# Patient Record
Sex: MALE | Race: WHITE | NOT HISPANIC OR LATINO
[De-identification: names, ages, dates, MRNs, and addresses within clinical notes are randomized per-mention and may not be internally consistent; named-entity substitution may affect disease eponyms.]

---

## 2017-04-05 ENCOUNTER — APPOINTMENT (OUTPATIENT)
Dept: UROLOGY | Facility: CLINIC | Age: 72
End: 2017-04-05

## 2019-05-10 ENCOUNTER — INPATIENT (INPATIENT)
Facility: HOSPITAL | Age: 74
LOS: 5 days | Discharge: HOME | DRG: 39 | End: 2019-05-16
Attending: SURGERY | Admitting: SPECIALIST
Payer: COMMERCIAL

## 2019-05-10 VITALS
RESPIRATION RATE: 16 BRPM | OXYGEN SATURATION: 95 % | TEMPERATURE: 98 F | DIASTOLIC BLOOD PRESSURE: 85 MMHG | SYSTOLIC BLOOD PRESSURE: 204 MMHG | HEART RATE: 66 BPM

## 2019-05-10 DIAGNOSIS — Z95.9 PRESENCE OF CARDIAC AND VASCULAR IMPLANT AND GRAFT, UNSPECIFIED: Chronic | ICD-10-CM

## 2019-05-10 DIAGNOSIS — L05.91 PILONIDAL CYST WITHOUT ABSCESS: Chronic | ICD-10-CM

## 2019-05-10 LAB
ALBUMIN SERPL ELPH-MCNC: 4.4 G/DL — SIGNIFICANT CHANGE UP (ref 3.3–5)
ALP SERPL-CCNC: 52 U/L — SIGNIFICANT CHANGE UP (ref 40–120)
ALT FLD-CCNC: 24 U/L — SIGNIFICANT CHANGE UP (ref 10–45)
ANION GAP SERPL CALC-SCNC: 10 MMOL/L — SIGNIFICANT CHANGE UP (ref 5–17)
APTT BLD: 31.7 SEC — SIGNIFICANT CHANGE UP (ref 27.5–36.3)
AST SERPL-CCNC: 21 U/L — SIGNIFICANT CHANGE UP (ref 10–40)
BASOPHILS # BLD AUTO: 0.1 K/UL — SIGNIFICANT CHANGE UP (ref 0–0.2)
BASOPHILS NFR BLD AUTO: 0.9 % — SIGNIFICANT CHANGE UP (ref 0–2)
BILIRUB SERPL-MCNC: 0.6 MG/DL — SIGNIFICANT CHANGE UP (ref 0.2–1.2)
BUN SERPL-MCNC: 18 MG/DL — SIGNIFICANT CHANGE UP (ref 7–23)
CALCIUM SERPL-MCNC: 9.5 MG/DL — SIGNIFICANT CHANGE UP (ref 8.4–10.5)
CHLORIDE SERPL-SCNC: 103 MMOL/L — SIGNIFICANT CHANGE UP (ref 96–108)
CO2 SERPL-SCNC: 27 MMOL/L — SIGNIFICANT CHANGE UP (ref 22–31)
CREAT SERPL-MCNC: 0.88 MG/DL — SIGNIFICANT CHANGE UP (ref 0.5–1.3)
EOSINOPHIL # BLD AUTO: 0.3 K/UL — SIGNIFICANT CHANGE UP (ref 0–0.5)
EOSINOPHIL NFR BLD AUTO: 2.7 % — SIGNIFICANT CHANGE UP (ref 0–6)
GLUCOSE SERPL-MCNC: 92 MG/DL — SIGNIFICANT CHANGE UP (ref 70–99)
HCT VFR BLD CALC: 46.7 % — SIGNIFICANT CHANGE UP (ref 39–50)
HGB BLD-MCNC: 15.7 G/DL — SIGNIFICANT CHANGE UP (ref 13–17)
INR BLD: 1.1 RATIO — SIGNIFICANT CHANGE UP (ref 0.88–1.16)
LYMPHOCYTES # BLD AUTO: 1.7 K/UL — SIGNIFICANT CHANGE UP (ref 1–3.3)
LYMPHOCYTES # BLD AUTO: 15.7 % — SIGNIFICANT CHANGE UP (ref 13–44)
MCHC RBC-ENTMCNC: 29.7 PG — SIGNIFICANT CHANGE UP (ref 27–34)
MCHC RBC-ENTMCNC: 33.5 GM/DL — SIGNIFICANT CHANGE UP (ref 32–36)
MCV RBC AUTO: 88.7 FL — SIGNIFICANT CHANGE UP (ref 80–100)
MONOCYTES # BLD AUTO: 0.9 K/UL — SIGNIFICANT CHANGE UP (ref 0–0.9)
MONOCYTES NFR BLD AUTO: 8.5 % — SIGNIFICANT CHANGE UP (ref 2–14)
NEUTROPHILS # BLD AUTO: 8 K/UL — HIGH (ref 1.8–7.4)
NEUTROPHILS NFR BLD AUTO: 72.3 % — SIGNIFICANT CHANGE UP (ref 43–77)
PLATELET # BLD AUTO: 233 K/UL — SIGNIFICANT CHANGE UP (ref 150–400)
POTASSIUM SERPL-MCNC: 3.9 MMOL/L — SIGNIFICANT CHANGE UP (ref 3.5–5.3)
POTASSIUM SERPL-SCNC: 3.9 MMOL/L — SIGNIFICANT CHANGE UP (ref 3.5–5.3)
PROT SERPL-MCNC: 7.2 G/DL — SIGNIFICANT CHANGE UP (ref 6–8.3)
PROTHROM AB SERPL-ACNC: 12.7 SEC — SIGNIFICANT CHANGE UP (ref 10–12.9)
RBC # BLD: 5.27 M/UL — SIGNIFICANT CHANGE UP (ref 4.2–5.8)
RBC # FLD: 13.1 % — SIGNIFICANT CHANGE UP (ref 10.3–14.5)
SODIUM SERPL-SCNC: 140 MMOL/L — SIGNIFICANT CHANGE UP (ref 135–145)
WBC # BLD: 11.1 K/UL — HIGH (ref 3.8–10.5)
WBC # FLD AUTO: 11.1 K/UL — HIGH (ref 3.8–10.5)

## 2019-05-10 PROCEDURE — 70498 CT ANGIOGRAPHY NECK: CPT | Mod: 26

## 2019-05-10 PROCEDURE — 99220: CPT

## 2019-05-10 PROCEDURE — 93010 ELECTROCARDIOGRAM REPORT: CPT

## 2019-05-10 PROCEDURE — 71046 X-RAY EXAM CHEST 2 VIEWS: CPT | Mod: 26

## 2019-05-10 PROCEDURE — 70496 CT ANGIOGRAPHY HEAD: CPT | Mod: 26

## 2019-05-10 RX ORDER — CLOPIDOGREL BISULFATE 75 MG/1
75 TABLET, FILM COATED ORAL DAILY
Refills: 0 | Status: DISCONTINUED | OUTPATIENT
Start: 2019-05-10 | End: 2019-05-11

## 2019-05-10 RX ORDER — ATORVASTATIN CALCIUM 80 MG/1
80 TABLET, FILM COATED ORAL AT BEDTIME
Refills: 0 | Status: DISCONTINUED | OUTPATIENT
Start: 2019-05-10 | End: 2019-05-15

## 2019-05-10 RX ADMIN — ATORVASTATIN CALCIUM 80 MILLIGRAM(S): 80 TABLET, FILM COATED ORAL at 23:35

## 2019-05-10 RX ADMIN — CLOPIDOGREL BISULFATE 75 MILLIGRAM(S): 75 TABLET, FILM COATED ORAL at 23:35

## 2019-05-10 NOTE — ED CDU PROVIDER INITIAL DAY NOTE - DETAILS
- frequent re-eval  - vitals q 4hrs  - tele  - neurochecks q 4hrs  - b/l carotid duplex  - neuro and vascular following  - case discussed with attending Dr. Lopez

## 2019-05-10 NOTE — ED PROVIDER NOTE - CRITICAL CARE PROVIDED
direct patient care (not related to procedure)/additional history taking/interpretation of diagnostic studies/neuro/documentation/consultation with other physicians

## 2019-05-10 NOTE — ED ADULT TRIAGE NOTE - CHIEF COMPLAINT QUOTE
slurred speech since 1030 and numbness to right side of face and couldn't find his words this morning

## 2019-05-10 NOTE — CONSULT NOTE ADULT - ATTENDING COMMENTS
Carotid US confirms high grade  70-99 L ICA stenosis which is symptomatic.  Will admit patient to stroke ICR.    Plan   Start Heparin gtt PTT goal 60-80 No bolus.  MRI Brain.  Close neuro checks.

## 2019-05-10 NOTE — CONSULT NOTE ADULT - SUBJECTIVE AND OBJECTIVE BOX
VASCULAR SURGERY SERVICE (pager - #[phone]) - CONSULT NOTE  --------------------------------------------------------------------------------------------    Patient is a 73y old  Male who presents with a chief complaint of R face numbness     HPI: 72 yo M with a PMH of HTN, HLD, CAD (s/p stent 2/2019) and GERD p/w word finding difficulty, lasting 1.5 minutes this AM w/o recurrence, starting around 1000. Pt also notes mildly decreased sensation on the right jaw line and over the right side of his lip, occurred a week ago then recurred today at the same time as the word finding difficulty. No focal weakness or other numbness, no headache/vision changes. He has had similar decreased facial sensation intermittently for months, maybe occurring once or twice per month. No prior evals for this change in sensation. CTA of neck performed revealing 70% occlusion of L ICA. Vascular surgery consulted for possible intervention given persistent symptoms.     ROS: 10-system review is otherwise negative except HPI above.      PAST MEDICAL & SURGICAL HISTORY:  Dyslipidemia  Hiatal hernia with GERD  Hypertension  Carpal tunnel syndrome, right  Coronary artery disease: stent placed 2014  Pilonidal cyst  S/P angioplasty with stent: 2014    FAMILY HISTORY:  Family history of coronary artery disease (Father)  Family history of cerebrovascular accident (CVA)  Family history of CABG (Mother)    SOCIAL HISTORY:  Never smoker. Rarely drinks EtOH. Denied drug use.     ALLERGIES: amoxicillin (Hives)  penicillin (Hives)      HOME MEDICATIONS:   Aspirin Low Dose 81 mg oral delayed release tablet: 1 tab(s) orally once a day (12 Jun 2016 02:55)  Sue 5 mg-20 mg oral tablet: 1 tab(s) orally once a day (12 Jun 2016 02:55)  Co Q-10 100 mg oral capsule: 1 cap(s) orally once a day (12 Jun 2016 02:55)  Shannan-C 500 mg oral tablet:  orally  (12 Jun 2016 02:55)  Flax Seed Oil oral capsule:  orally  (12 Jun 2016 02:55)  metoprolol tartrate 25 mg oral tablet: 1 tab(s) orally once a day (12 Jun 2016 02:55)  multivitamin:    (12 Jun 2016 02:55)  potassium citrate: 90 milligram(s) orally once a day (12 Jun 2016 02:55)  simvastatin 20 mg oral tablet: 1 tab(s) orally once a day (at bedtime) (12 Jun 2016 02:55)      CURRENT MEDICATIONS  MEDICATIONS (STANDING):   MEDICATIONS (PRN):  --------------------------------------------------------------------------------------------    Vitals:   T(C): 36.8 (05-10-19 @ 19:22), Max: 37.2 (05-10-19 @ 16:40)  HR: 71 (05-10-19 @ 19:22) (59 - 71)  BP: 141/70 (05-10-19 @ 19:22) (135/62 - 204/85)  RR: 17 (05-10-19 @ 19:22) (16 - 18)  SpO2: 96% (05-10-19 @ 19:22) (95% - 97%)  CAPILLARY BLOOD GLUCOSE  85 (10 May 2019 16:39)      POCT Blood Glucose.: 85 mg/dL (10 May 2019 16:25)    CAPILLARY BLOOD GLUCOSE  85 (10 May 2019 16:39)      POCT Blood Glucose.: 85 mg/dL (10 May 2019 16:25)          PHYSICAL EXAM:   General: NAD  HEENT: Normocephalic, atraumatic, EOMI, PEERLA.  Cardiac: RRR  Respiratory: Breathing comfortably on RA   Abdomen: Soft, non-distended, non-tender   Groin: Normal appearing  Ext: moving all ext warm  Neuro: CN II-XII intact. Strength and sensation intact in bilateral upper and lower ext.   --------------------------------------------------------------------------------------------    LABS  CBC (05-10 @ 16:36)                              15.7                           11.1<H>  )----------------(  233        72.3  % Neutrophils, 15.7  % Lymphocytes, ANC: 8.0<H>                              46.7      BMP (05-10 @ 16:36)             140     |  103     |  18    		Ca++ --      Ca 9.5                ---------------------------------( 92    		Mg --                 3.9     |  27      |  0.88  			Ph --        LFTs (05-10 @ 16:36)      TPro 7.2 / Alb 4.4 / TBili 0.6 / DBili -- / AST 21 / ALT 24 / AlkPhos 52    Coags (05-10 @ 16:36)  aPTT 31.7 / INR 1.10 / PT 12.7          --------------------------------------------------------------------------------------------    MICROBIOLOGY      --------------------------------------------------------------------------------------------    IMAGING  < from: CT Angio Neck w/ IV Cont (05.10.19 @ 16:49) >  FINDINGS:    The aortic arch and origins of the great vessels are within normal limits.    Right: The origin of the right common carotid artery is normal. The right   common carotid artery is normal in course and caliber to the carotid   bifurcation. Origins of the internal and external carotid arteries are   normal. The right internal carotid artery is normal thereafter in course   and caliber to the intracranial segment.    The intracranial internal carotid artery is normal to the [place]. There is a hypoplastic right A1 segment of the anterior cerebral,   normal anatomic variant  There is a hypoplastic distal right vertebral segment..    Branch vasculature of the posterior circulation is within normal limits.   Fetal origin of the left PCA from the left internal carotid artery    There is no evidence for aneurysm, stenosis, or vascular malformation.      Left: The origin of the left common carotid artery is normal. The left   common carotid artery is normal in course and caliber to the carotid   bifurcation. 70% stenosis at the origin of the left internal carotid   artery is suggested based on NASCETcriteria. The intracranial internal   carotid artery is normal to the [place]. The anterior middle   cerebral arteries demonstrate normal enhancement and arborization   symmetric fat on the right.    The origin of the left vertebral artery is normal. The left vertebral   artery is normal in course in caliber to intradural segment and   vertebrobasilar confluence.    There is no evidence for aneurysm, stenosis, or vascular malformation.        The visualized major dural venous sinuses and deep cerebral venous   structures demonstrate normal enhancement.    There is no definite evidence for enhancement abnormality within the   brain parenchyma.        IMPRESSION:  Evaluation of the brain demonstrates a high attenuation focus in the left   centrum semiovale ovale suspicious for mineralization in association with   the possible cavernoma or an old area of hemorrhage or infection.  Evidence of a 70% stenosis of the left internal carotid artery with a   calcified plaque at its origin at the level of the carotid bulb. The   right internal carotid artery origin is unremarkable    < end of copied text >

## 2019-05-10 NOTE — ED ADULT NURSE NOTE - CHPI ED NUR SYMPTOMS NEG
no change in level of consciousness/no weakness/no blurred vision/no dizziness/no vomiting/no fever/no confusion/no loss of consciousness/no nausea

## 2019-05-10 NOTE — ED ADULT NURSE REASSESSMENT NOTE - COMFORT CARE
ambulated to bathroom/warm blanket provided/meal provided/po fluids offered/repositioned/darkened lights/plan of care explained

## 2019-05-10 NOTE — ED CLERICAL - NS ED CLERK NOTE PRE-ARRIVAL INFORMATION; ADDITIONAL PRE-ARRIVAL INFORMATION
CC/Reason For referral: Stroke  Preferred Consultant(if applicable):  Who admits for you (if needed):  Do you have documents you would like to fax over?  Would you still like to speak to an ED attending? no

## 2019-05-10 NOTE — ED PROVIDER NOTE - PMH
Carpal tunnel syndrome, right    Coronary artery disease  stent placed 2014  Dyslipidemia    Hiatal hernia with GERD    Hypertension

## 2019-05-10 NOTE — ED ADULT NURSE NOTE - NSIMPLEMENTINTERV_GEN_ALL_ED
Implemented All Universal Safety Interventions:  [place] to call system. Call bell, personal items and telephone within reach. Instruct patient to call for assistance. Room bathroom lighting operational. Non-slip footwear when patient is off stretcher. Physically safe environment: no spills, clutter or unnecessary equipment. Stretcher in lowest position, wheels locked, appropriate side rails in place.

## 2019-05-10 NOTE — CONSULT NOTE ADULT - ASSESSMENT
ASSESSMENT: Patient is a 73M w/ PMHx as noted with months of intermittent right sided facial numbness and today brief moment of "word finding difficulty"  now resolved with CTA of neck revealing 70% occlusion of L ICA    PLAN:    - No acute surgical intervention at this time  - Please obtain bilateral carotid duplex to assess degree of stenosis  - Recommend medical management of TIA symptoms for now  - will follow  - Plan discussed with vascular fellow, Dr Fabian, on behalf of  Attending, Dr. Brandyn Decker PGY-2  Vascular surgery team  [id number] ASSESSMENT: Patient is a 73M w/ PMHx as noted with months of intermittent right sided facial numbness and today brief moment of "word finding difficulty"  now resolved with CTA of neck revealing 70% occlusion of L ICA    PLAN:    - No acute surgical intervention at this time  - Please obtain bilateral carotid duplex to assess degree of stenosis, maybe obtained as outpatient  - Pt should be instructed to follow-up with Dr Ahumada, call to schedule appointment [phone]  - Dispo as per ED  - Plan to be discussed with vascular fellow, Dr Fabian, on behalf of  Attending, Dr. Brandyn Decker PGY-2  Vascular surgery team  [id number] ASSESSMENT: Patient is a 73M w/ PMHx as noted with months of intermittent right sided facial numbness and today brief moment of "word finding difficulty"  now resolved with CTA of neck revealing 70% occlusion of L ICA    PLAN:    - No acute surgical intervention at this time  - Please obtain bilateral carotid duplex to assess degree of stenosis  - Pt should be instructed to follow-up with Dr Ahumada, call to schedule appointment [phone]  - Dispo as per neurology  - Plan to be discussed with vascular fellow, Dr Fabian, on behalf of  Attending, Dr. Brandyn Decker PGY-2  Vascular surgery team  [id number] ASSESSMENT: Patient is a 73M w/ PMHx as noted with months of intermittent right sided facial numbness and today brief moment of "word finding difficulty"  now resolved with CTA of neck revealing 70% occlusion of L ICA    PLAN:    - No acute surgical intervention at this time  - Please obtain bilateral carotid duplex to assess degree of stenosis  - Dispo as per neurology  - Plan to be discussed with vascular fellow, Dr Fabian, on behalf of  Attending, Dr. Brandyn Decker PGY-2  Vascular surgery team  [id number]

## 2019-05-10 NOTE — ED ADULT NURSE REASSESSMENT NOTE - NS ED NURSE REASSESS COMMENT FT1
Report received from   JORGE Mendosa  Pt resting comfortably with family at bedside.   Awaiting neuro and vascular consult  Safety maintained at all times, bed in lowest position, call bell in hand.  Will continue to monitor closely  Patient is A&Ox4. Face is symmetrical. PERRL 3mmB. Speech is clear. Patient is moving all extremities with 5/5 strength  please see paper neuro assessment in chart
pending CDU eval
pts family requesting answers to test results and CT scans  MD Lopez aware, speaking with patient and family  TBA
Received pt from JORGE Morales, received pt alert and responsive, oriented x4, denies any respiratory distress, SOB, or difficulty breathing. Pt transferred to CDU for neuro symptoms. Pt is asymptomatic currently and neuro intact. Pending carotid doppler in AM, pt aware. IV in place, patent and free of signs of infiltration, placed on continuous cardiac monitoring as ordered, NSR HR 60's.  pt denies chest pain or palpitations, V/S stable, pt afebrile, pt denies pain at this time. Pt educated on unit and unit rules, instructed patient to notify RN of any needed assistance, Pt verbalizes understanding, Call bell placed within reach. Safety maintained. Will continue to monitor. Family at bedside.

## 2019-05-10 NOTE — ED PROVIDER NOTE - OBJECTIVE STATEMENT
74 yo M with a PMH of HTN, HLD, CAD (s/p stent 2/2019) and GERD p/w word finding difficulty, lasting 1.5 minutes this AM w/o recurrence, starting around 1000. Pt also notes mildly decreased sensation on the right jaw line and over the right side of his lip, occurred a week ago then recurred today at the same time as the word finding difficulty. No focal weakness or other numbness, no headache/vision changes. He has had similar decreased facial sensation intermittently for months, maybe occurring once or twice per month. No prior evals for this change in sensation.

## 2019-05-10 NOTE — CONSULT NOTE ADULT - SUBJECTIVE AND OBJECTIVE BOX
HPI:  Pt is a 74 yo M with a PMH of HTN, HLD, CAD (s/p stent 2/2019) and GERD who is presenting with complaint of episode of "word finding difficulty" lasting 1.5 minutes this AM w/o recurrence LKN 1000. Pt notes 10% decreased sensation on the right jaw line and lip corner which has been waxing and waning over the past several days. Otherwise pt denies prior word finding difficulties, recent fevers, chills, HA, dizziness, vision changes. NIHSS: 0. MRS: 0.      MEDICATIONS  (STANDING):    MEDICATIONS  (PRN):    PAST MEDICAL & SURGICAL HISTORY:  Dyslipidemia  Hiatal hernia with GERD  Hypertension  Carpal tunnel syndrome, right  Coronary artery disease: stent placed 2014  Pilonidal cyst  S/P angioplasty with stent: 2014    FAMILY HISTORY:  Family history of coronary artery disease (Father)  Family history of cerebrovascular accident (CVA)  Family history of CABG (Mother)    Allergies    amoxicillin (Hives)  penicillin (Hives)    Intolerances    SHx - No smoking, No ETOH, No drug abuse    Review of Systems:  See HPI.    Vital Signs Last 24 Hrs  T(C): 36.7 (10 May 2019 16:20), Max: 36.7 (10 May 2019 16:20)  T(F): 98.1 (10 May 2019 16:20), Max: 98.1 (10 May 2019 16:20)  HR: 66 (10 May 2019 16:20) (66 - 66)  BP: 204/85 (10 May 2019 16:20) (204/85 - 204/85)  BP(mean): --  RR: 16 (10 May 2019 16:20) (16 - 16)  SpO2: 95% (10 May 2019 16:20) (95% - 95%)      General Exam:   General appearance: No acute distress             Neurological Exam:  Mental Status: Orientated to self, date and place.  Attention intact.  No dysarthria. Speech fluent.  Cranial Nerves:   PERRL, EOMI, VFF, no nystagmus. 10% decreased sensation to light touch in part of right CN V3.  No facial asymmetry.  Hearing intact to finger rub bilaterally.  Tongue, uvula and palate midline.  Sternocleidomastoid and Trapezius intact bilaterally.    Motor:   Tone: normal.                  Strength:     [] Upper extremity                      Delt       Bicep    Tricep                                                  R         5/5        5/5        5/5       5/5                                               L          5/5        5/5        5/5       5/5  [] Lower extremity                       HF          KE          KF        DF         PF                                               R        5/5 5/5 5/5 5/5 5/5                                               L         5/5 5/5 5/5 5/5 5/5  Pronator drift: none                 Dysmetria: None to finger-nose-finger b/l  No truncal ataxia.    Tremor: No resting, postural or action tremor.  No myoclonus.    Sensation: intact to light touch throughout, no extinction    Toes downgoing b/l    Gait: normal. HPI:  Pt is a 72 yo M with a PMH of HTN, HLD, CAD (s/p stent 2/2019), b/l Carotid Stenosis and GERD who is presenting with complaint of episode of "word finding difficulty" lasting 1.5 minutes this AM w/o recurrence. LKN 1000. Pt notes 10% decreased sensation on the right jaw line and lip corner which has been waxing and waning over the past several days. Otherwise pt denies prior word finding difficulties, recent fevers, chills, HA, dizziness, vision changes. NIHSS: 0. MRS: 0.      MEDICATIONS  (STANDING):    MEDICATIONS  (PRN):    PAST MEDICAL & SURGICAL HISTORY:  Dyslipidemia  Hiatal hernia with GERD  Hypertension  Carpal tunnel syndrome, right  Coronary artery disease: stent placed 2014  Pilonidal cyst  S/P angioplasty with stent: 2014    FAMILY HISTORY:  Family history of coronary artery disease (Father)  Family history of cerebrovascular accident (CVA)  Family history of CABG (Mother)    Allergies    amoxicillin (Hives)  penicillin (Hives)    Intolerances    SHx - No smoking, No ETOH, No drug abuse    Review of Systems:  See HPI.    Vital Signs Last 24 Hrs  T(C): 36.7 (10 May 2019 16:20), Max: 36.7 (10 May 2019 16:20)  T(F): 98.1 (10 May 2019 16:20), Max: 98.1 (10 May 2019 16:20)  HR: 66 (10 May 2019 16:20) (66 - 66)  BP: 204/85 (10 May 2019 16:20) (204/85 - 204/85)  BP(mean): --  RR: 16 (10 May 2019 16:20) (16 - 16)  SpO2: 95% (10 May 2019 16:20) (95% - 95%)      General Exam:   General appearance: No acute distress             Neurological Exam:  Mental Status: Orientated to self, date and place.  Attention intact.  No dysarthria. Speech fluent.  Cranial Nerves:   PERRL, EOMI, VFF, no nystagmus. 10% decreased sensation to light touch in part of right CN V3.  No facial asymmetry.  Hearing intact to finger rub bilaterally.  Tongue, uvula and palate midline.  Sternocleidomastoid and Trapezius intact bilaterally.    Motor:   Tone: normal.                  Strength:     [] Upper extremity                      Delt       Bicep    Tricep                                                  R         5/5        5/5        5/5       5/5                                               L          5/5 5/5 5/5 5/5  [] Lower extremity                       HF          KE          KF        DF         PF                                               R        5/5 5/5 5/5 5/5 5/5                                               L         5/5 5/5 5/5 5/5 5/5  Pronator drift: none                 Dysmetria: None to finger-nose-finger b/l  No truncal ataxia.    Tremor: No resting, postural or action tremor.  No myoclonus.    Sensation: intact to light touch throughout, no extinction    Toes downgoing b/l    Gait: normal. HPI:  Pt is a 74 yo M with a PMH of HTN, HLD, CAD (s/p stent 2/2014), b/l Carotid Stenosis and GERD who is presenting with complaint of episode of "word finding difficulty" lasting 1.5 minutes this AM w/o recurrence. LKN 1000. Pt notes 10% decreased sensation on the right jaw line and lip corner which has been waxing and waning over the past several days. Otherwise pt denies prior word finding difficulties, recent fevers, chills, HA, dizziness, vision changes. NIHSS: 0. MRS: 0.      MEDICATIONS  (STANDING):    MEDICATIONS  (PRN):    PAST MEDICAL & SURGICAL HISTORY:  Dyslipidemia  Hiatal hernia with GERD  Hypertension  Carpal tunnel syndrome, right  Coronary artery disease: stent placed 2014  Pilonidal cyst  S/P angioplasty with stent: 2014    FAMILY HISTORY:  Family history of coronary artery disease (Father)  Family history of cerebrovascular accident (CVA)  Family history of CABG (Mother)    Allergies    amoxicillin (Hives)  penicillin (Hives)    Intolerances    SHx - No smoking, No ETOH, No drug abuse    Review of Systems:  See HPI.    Vital Signs Last 24 Hrs  T(C): 36.7 (10 May 2019 16:20), Max: 36.7 (10 May 2019 16:20)  T(F): 98.1 (10 May 2019 16:20), Max: 98.1 (10 May 2019 16:20)  HR: 66 (10 May 2019 16:20) (66 - 66)  BP: 204/85 (10 May 2019 16:20) (204/85 - 204/85)  BP(mean): --  RR: 16 (10 May 2019 16:20) (16 - 16)  SpO2: 95% (10 May 2019 16:20) (95% - 95%)      General Exam:   General appearance: No acute distress             Neurological Exam:  Mental Status: Orientated to self, date and place.  Attention intact.  No dysarthria. Speech fluent.  Cranial Nerves:   PERRL, EOMI, VFF, no nystagmus. 10% decreased sensation to light touch in part of right CN V3.  No facial asymmetry.  Hearing intact to finger rub bilaterally.  Tongue, uvula and palate midline.  Sternocleidomastoid and Trapezius intact bilaterally.    Motor:   Tone: normal.                  Strength:     [] Upper extremity                      Delt       Bicep    Tricep                                                  R         5/5        5/5        5/5       5/5                                               L          5/5 5/5 5/5 5/5  [] Lower extremity                       HF          KE          KF        DF         PF                                               R        5/5 5/5 5/5 5/5 5/5                                               L         5/5 5/5 5/5 5/5 5/5  Pronator drift: none                 Dysmetria: None to finger-nose-finger b/l  No truncal ataxia.    Tremor: No resting, postural or action tremor.  No myoclonus.    Sensation: intact to light touch throughout, no extinction    Toes downgoing b/l    Gait: normal. HPI:  Pt is a 74 yo M with a PMH of HTN, HLD, CAD (s/p stent 2/2014), b/l Carotid Stenosis and GERD who is presenting with complaint of episode of "word finding difficulty" lasting 1.5 minutes this AM w/o recurrence. LKN 1000. Pt notes 10% decreased sensation on the right jaw line and lip corner which has been waxing and waning over the past several weeks. Otherwise pt denies prior word finding difficulties, recent fevers, chills, HA, dizziness, vision changes. NIHSS: 0. MRS: 0.      MEDICATIONS  (STANDING):    MEDICATIONS  (PRN):    PAST MEDICAL & SURGICAL HISTORY:  Dyslipidemia  Hiatal hernia with GERD  Hypertension  Carpal tunnel syndrome, right  Coronary artery disease: stent placed 2014  Pilonidal cyst  S/P angioplasty with stent: 2014    FAMILY HISTORY:  Family history of coronary artery disease (Father)  Family history of cerebrovascular accident (CVA)  Family history of CABG (Mother)    Allergies    amoxicillin (Hives)  penicillin (Hives)    Intolerances    SHx - No smoking, No ETOH, No drug abuse    Review of Systems:  See HPI.    Vital Signs Last 24 Hrs  T(C): 36.7 (10 May 2019 16:20), Max: 36.7 (10 May 2019 16:20)  T(F): 98.1 (10 May 2019 16:20), Max: 98.1 (10 May 2019 16:20)  HR: 66 (10 May 2019 16:20) (66 - 66)  BP: 204/85 (10 May 2019 16:20) (204/85 - 204/85)  BP(mean): --  RR: 16 (10 May 2019 16:20) (16 - 16)  SpO2: 95% (10 May 2019 16:20) (95% - 95%)      General Exam:   General appearance: No acute distress             Neurological Exam:  Mental Status: Orientated to self, date and place.  Attention intact.  No dysarthria. Speech fluent.  Cranial Nerves:   PERRL, EOMI, VFF, no nystagmus. 10% decreased sensation to light touch in part of right CN V3.  No facial asymmetry.  Hearing intact to finger rub bilaterally.  Tongue, uvula and palate midline.  Sternocleidomastoid and Trapezius intact bilaterally.    Motor:   Tone: normal.                  Strength:     [] Upper extremity                      Delt       Bicep    Tricep                                                  R         5/5        5/5        5/5       5/5                                               L          5/5 5/5 5/5 5/5  [] Lower extremity                       HF          KE          KF        DF         PF                                               R        5/5 5/5 5/5 5/5 5/5                                               L         5/5 5/5 5/5 5/5 5/5  Pronator drift: none                 Dysmetria: None to finger-nose-finger b/l  No truncal ataxia.    Tremor: No resting, postural or action tremor.  No myoclonus.    Sensation: intact to light touch throughout, no extinction    Toes downgoing b/l    Gait: normal. HPI:  Pt is a 74 yo M with a PMH of HTN, HLD, CAD (s/p stent 2/2014), b/l Carotid Stenosis and GERD who is presenting with complaint of episode of "word finding difficulty" lasting 1.5 minutes this AM w/o recurrence. LKN 1000. Pt notes 10% decreased sensation on the right jaw line and lip corner which has been waxing and waning over the past several weeks. Otherwise pt denies prior word finding difficulties, recent fevers, chills, HA, dizziness, vision changes. NIHSS: 0. ABCD2 Score: 3. MRS: 0.      MEDICATIONS  (STANDING):    MEDICATIONS  (PRN):    PAST MEDICAL & SURGICAL HISTORY:  Dyslipidemia  Hiatal hernia with GERD  Hypertension  Carpal tunnel syndrome, right  Coronary artery disease: stent placed 2014  Pilonidal cyst  S/P angioplasty with stent: 2014    FAMILY HISTORY:  Family history of coronary artery disease (Father)  Family history of cerebrovascular accident (CVA)  Family history of CABG (Mother)    Allergies    amoxicillin (Hives)  penicillin (Hives)    Intolerances    SHx - No smoking, No ETOH, No drug abuse    Review of Systems:  See HPI.    Vital Signs Last 24 Hrs  T(C): 36.7 (10 May 2019 16:20), Max: 36.7 (10 May 2019 16:20)  T(F): 98.1 (10 May 2019 16:20), Max: 98.1 (10 May 2019 16:20)  HR: 66 (10 May 2019 16:20) (66 - 66)  BP: 204/85 (10 May 2019 16:20) (204/85 - 204/85)  BP(mean): --  RR: 16 (10 May 2019 16:20) (16 - 16)  SpO2: 95% (10 May 2019 16:20) (95% - 95%)      General Exam:   General appearance: No acute distress             Neurological Exam:  Mental Status: Orientated to self, date and place.  Attention intact.  No dysarthria. Speech fluent.  Cranial Nerves:   PERRL, EOMI, VFF, no nystagmus. 10% decreased sensation to light touch in part of right CN V3.  No facial asymmetry.  Hearing intact to finger rub bilaterally.  Tongue, uvula and palate midline.  Sternocleidomastoid and Trapezius intact bilaterally.    Motor:   Tone: normal.                  Strength:     [] Upper extremity                      Delt       Bicep    Tricep                                                  R         5/5        5/5        5/5       5/5                                               L          5/5 5/5 5/5 5/5  [] Lower extremity                       HF          KE          KF        DF         PF                                               R        5/5 5/5 5/5 5/5 5/5                                               L         5/5 5/5 5/5 5/5 5/5  Pronator drift: none                 Dysmetria: None to finger-nose-finger b/l  No truncal ataxia.    Tremor: No resting, postural or action tremor.  No myoclonus.    Sensation: intact to light touch throughout, no extinction    Toes downgoing b/l    Gait: normal.

## 2019-05-10 NOTE — ED ADULT NURSE REASSESSMENT NOTE - GENERAL PATIENT STATE
family/SO at bedside/resting/sleeping/smiling/interactive/comfortable appearance/cooperative/improvement verbalized

## 2019-05-10 NOTE — ED CDU PROVIDER INITIAL DAY NOTE - OBJECTIVE STATEMENT
74 y/o M with a PMH of HTN, HLD, CAD (s/p stent 2/2019) and GERD c/o word finding difficulty x 1 day. pt states he could not come up with the words he wanted to say this AM around 10. states it was severe and lasted 1.5 minutes. after that pt had intermittent recurrences throughout the day that were less severe. Pt also notes mildly decreased sensation on the right jaw line, right face, and right side of his lips intermittently x 2 months that recurred today at the same time as the word finding difficulty. pt also notes having decreased balance intermittently x 2 months, no falls. pt denies focal weakness, tingling, headache, vision changes, fever, chills, sweats, CP, SOB, abd pain, back pain, problems going to the bathroom.   In ED, WBC 11.1, other labs unremarkable, CXR negative, CTH showed high attenuation focus L centrum semiovale could be cavernoma or old hemorhage/infxn, CTA Head negative, CTA neck + with 70% stenosis of the L internal carotid artery. neuro and vascular c/s'd, recommended carotid duplex. pt sent to CDU for continued monitoring and work up.     PMD Dr. Campbell  cardio Dr. CHECO [place]  no neuro

## 2019-05-10 NOTE — ED CDU PROVIDER INITIAL DAY NOTE - FAMILY HISTORY
Family history of cerebrovascular accident (CVA)     Mother  Still living? No  Family history of CABG, Age at diagnosis: Age Unknown     Father  Still living? No  Family history of coronary artery disease, Age at diagnosis: Age Unknown

## 2019-05-10 NOTE — ED CDU PROVIDER INITIAL DAY NOTE - MEDICAL DECISION MAKING DETAILS
72y/o M with PMH HTN, HLD, CAD, 1 stent, presented with difficulty word finding. also h/o R jaw/face/lip numbness intermittently x 2 months with decreased balance. pt code stroke. CTA neck shows 70% stenosis L internal carotid artery. neuro and vascular following. observe pt in CDU and get carotid duplex. -ZR.

## 2019-05-10 NOTE — ED CDU PROVIDER INITIAL DAY NOTE - CPE EDP NEURO NORM
Patient Education         Learning About Attention Deficit Hyperactivity Disorder (ADHD) in Adults  What is ADHD? Attention deficit hyperactivity disorder (ADHD) is a condition in which people have a hard time paying attention. Adults with ADHD also may be more active than normal. They tend to act without thinking. ADHD may make it harder for them to focus, get organized, and finish tasks. ADHD most often starts in childhood and lasts into adulthood. Many adults don't know that they have ADHD until their children are diagnosed. Then they begin to see their own symptoms. Doctors don't know what causes ADHD. But it tends to run in families. What are the symptoms? The most common types of ADHD symptoms in adults are attention problems and hyperactivity. Attention problems  Adults with ADHD often find it hard to:  · Finish tasks that don't interest them or aren't easy. But they may become obsessed with activities that they find interesting and enjoy. · Keep relationships. · Focus their attention on conversations, reading materials, or jobs. They may change jobs a lot. · Remember things. They may misplace or lose things. · Pay attention. They are easily distracted. They find it hard to focus on one task. · Think before they act. They may make quick decisions. They may act before they think about the effect of their actions. Hyperactivity  Adults with ADHD may:  · Fidget. They may swing their legs, shift in their seats, or tap their fingers. · Move around a lot. They may feel \"revved up\" or on the go. They may not be able to slow down until they are very tired. · Find it hard to relax. They may feel restless and find it hard to do quiet things like read or watch TV. How does ADHD affect daily life? ADHD in adults may affect:  · Job performance. They may find it hard to organize their work, manage their time, and focus on one task at a time. They may forget, misplace, or lose things.  They may quit their jobs out of boredom. · Relationships. Adults with ADHD may find it hard to focus their attention on conversations. It is hard for them to \"read\" the behavior and moods of others and express their own feelings. · Temper. They may get easily frustrated. This often can make it harder for them to deal with stress. These adults may overreact and have a short, quick temper. · The ability to solve problems. Adults who have a hard time waiting for things they want may act before they think about the effect of their actions. They may take part in risky behaviors. These include unprotected sex, unsafe driving, alcohol and drug use, or unwise business ventures. How is ADHD treated?   ADHD can be treated with medicines, behavior training, or counseling. Or it may be a combination of these treatments. Medicines   Stimulant medicines are most often used to treat ADHD. These may include:    · Amphetamines (such as Adderall and Dexedrine).     · Methylphenidate (such as Concerta, Daytrana, Focalin, Metadate, and Ritalin).    Other medicines that may be used are:    · Atomoxetine, such as Strattera, a nonstimulant medicine for ADHD.     · Antihypertensives. These include clonidine (such as Catapres) and guanfacine (such as Tenex).   · Antidepressants, which include bupropion (Wellbutrin).   [employer] training can help adults with ADHD learn how to:    · Get organized. A daily organizer or planner can help these adults organize their daily tasks. They can write down appointments and other things they need to remember.     · Decrease distractions. They can set up their work or home environment so that there are fewer things that will distract them. They may find using headphones or a \"white noise\" machine helpful. College students can arrange a quiet living situation. They may need a single dorm room.     · Work on relationships.  Social skills training can help adults with ADHD relate to family, friends, and coworkers. Couples counseling or family therapy can also help improve relationships.   [employer] [place] is not meant to treat inattention, hyperactivity, or impulsiveness. But it can help with some of the problems that go along with ADHD. These include not getting along well with others and having problems following rules. Where can you learn more? Go to [email or web address]://[email or web address].[email or web address] org and sign in to your [employer] account. Enter V461 in the UNI5 box to learn more about \"Learning About Attention Deficit Hyperactivity Disorder (ADHD) in Adults. \"     If you do not have an account, please click on the \"Sign Up Now\" link. Current as of: September 11, 2018  Content Version: 11.9  © 3423-0952 [employer]. Care instructions adapted under license by [facility] ([facility]). If you have questions about a medical condition or this instruction, always ask your healthcare professional. [address] any warranty or liability for your use of this information. - - -

## 2019-05-10 NOTE — ED ADULT NURSE NOTE - OBJECTIVE STATEMENT
male 73 years old with history of HTN and CAD s/p Stent 2/2019, came in for difficulty of finding words which started at 10:30 this morning. 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 Denies headache, vision change, dizziness, chest pain and sob. Stroke called at 1621 and stroke protocol followed. Speech is clear, all extremities strong. NIHSS  score-0. Will continue to monitor. Safety maintained.

## 2019-05-10 NOTE — CONSULT NOTE ADULT - ASSESSMENT
Pt is a 72 yo M with a PMH of HTN, HLD, CAD (s/p stent 2/2019) and GERD who is presenting with complaint of episode of "word finding difficulty" lasting 1.5 minutes this AM w/o recurrence LKN 1000. Pt notes 10% decreased sensation on the right jaw line and lip corner which has been waxing and waning over the past several days. Otherwise pt denies prior word finding difficulties, recent fevers, chills, HA, dizziness, vision changes. NIHSS: 0. MRS: 0. Pt is a 74 yo M with a PMH of HTN, HLD, CAD (s/p stent 2/2019), b/l Carotid Stenosis and GERD who is presenting with complaint of episode of "word finding difficulty" lasting 1.5 minutes this AM w/o recurrence. LKN 1000. On exam pt with 10% decreased sensation in parts of V3 on the right, otherwise nonfocal. 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 Hx and exam consistent with TIA, etiology likely being large to small artery embolism from stenotic left ICA. NIHSS: 0. MRS: 0.    Recommendations:  - c/w home ASA  - Increase home Crestor 5mg HS to 20mg HS  - Start Plavix 75mg QD for 3 weeks  - If left ICA stenosis greater than 70% consult vascular surgery for possible CEA  - Follow up outpatient neurology, can call [phone] for appointment with Dr. Tapia Pt is a 72 yo M with a PMH of HTN, HLD, CAD (s/p stent 2/2014), b/l Carotid Stenosis and GERD who is presenting with complaint of episode of "word finding difficulty" lasting 1.5 minutes this AM w/o recurrence. LKN 1000. On exam pt with 10% decreased sensation in parts of V3 on the right, otherwise nonfocal. 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 Hx and exam consistent with TIA, etiology likely being large to small artery embolism from stenotic left ICA. NIHSS: 0. MRS: 0.    Recommendations:  - c/w home ASA  - Increase home Crestor 5mg HS to 20mg HS  - Start Plavix 75mg QD for 3 weeks  - If left ICA stenosis greater than 70% consult vascular surgery for possible CEA  - Follow up outpatient neurology, can call [phone] for appointment with Dr. Tapia Pt is a 74 yo M with a PMH of HTN, HLD, CAD (s/p stent 2/2014), b/l Carotid Stenosis and GERD who is presenting with complaint of episode of "word finding difficulty" lasting 1.5 minutes this AM w/o recurrence. LKN 1000. On exam pt with 10% decreased sensation in parts of V3 on the right, otherwise nonfocal. 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 Hx and exam consistent with TIA, etiology likely being large to small artery embolism from stenotic left ICA. Left partial V3 numbness does not correspond with any neurovascular territory. NIHSS: 0. MRS: 0.    Recommendations:  - c/w home ASA  - Increase home Crestor 5mg HS to 20mg HS  - Start Plavix 75mg QD for 3 weeks  - If left ICA stenosis greater than 70% consult vascular surgery for possible CEA  - Follow up outpatient neurology, can call [phone] for appointment with Dr. Tapia Pt is a 72 yo M with a PMH of HTN, HLD, CAD (s/p stent 2/2014), b/l Carotid Stenosis and GERD who is presenting with complaint of episode of "word finding difficulty" lasting 1.5 minutes this AM w/o recurrence. LKN 1000. On exam pt with 10% decreased sensation in parts of V3 on the right, otherwise nonfocal. 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 Hx and exam consistent with TIA, etiology likely being large to small artery embolism from stenotic left ICA. Left partial V3 numbness does not correspond with any neurovascular territory. NIHSS: 0. ABCD2 Score: 3. MRS: 0.    Recommendations:  - c/w home ASA  - Increase home Crestor 5mg HS to 20mg HS  - Start Plavix 75mg QD for 3 weeks  - If left ICA stenosis greater than 70% consult vascular surgery for possible CEA  - Follow up outpatient neurology, can call [phone] for appointment with Dr. Tapia Pt is a 72 yo M with a PMH of HTN, HLD, CAD (s/p stent 2/2014), b/l Carotid Stenosis and GERD who is presenting with complaint of episode of "word finding difficulty" lasting 1.5 minutes this AM w/o recurrence. LKN 1000. On exam pt with 10% decreased sensation in parts of V3 on the right, otherwise nonfocal. 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 Hx and exam consistent with TIA, etiology likely being large to small artery embolism from stenotic left ICA causing single episode of aphasia. Left partial V3 numbness does not correspond with any neurovascular territory, is not a stroke symptom and should not be considered when evaluating for CEA. NIHSS: 0. ABCD2 Score: 3. MRS: 0.    Recommendations:  - c/w home ASA  - Increase home Crestor 5mg HS to 20mg HS  - Start Plavix 75mg QD for 3 weeks  - If left ICA stenosis greater than 70% consult vascular surgery for possible CEA  - Follow up outpatient neurology, can call [phone] for appointment with Dr. Tapia Pt is a 72 yo M with a PMH of HTN, HLD, CAD (s/p stent 2/2014), b/l Carotid Stenosis and GERD who is presenting with complaint of episode of "word finding difficulty" lasting 1.5 minutes this AM w/o recurrence. LKN 1000. On exam pt with 10% decreased sensation in parts of V3 on the right, otherwise nonfocal. 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 Hx and exam consistent with TIA, etiology likely being large to small artery embolism from stenotic left ICA causing single episode of aphasia. Left partial V3 numbness does not correspond with any neurovascular territory, is not a stroke symptom and should not be considered when evaluating for CEA. NIHSS: 0. ABCD2 Score: 3. MRS: 0.    Recommendations:  - c/w home ASA  - Increase home Crestor 5mg HS to 20mg HS  - Start Plavix 75mg QD for 3 weeks  - If left ICA stenosis greater than 70% consult vascular surgery for possible CEA  - Cleared for discharge from neurovascular perspective  - Follow up outpatient neurology, can call [phone] for appointment with Dr. Tapia  - Case discussed with Dr. Tapia Pt is a 74 yo M with a PMH of HTN, HLD, CAD (s/p stent 2/2014), b/l Carotid Stenosis and GERD who is presenting with complaint of episode of "word finding difficulty" lasting 1.5 minutes this AM w/o recurrence. LKN 1000. On exam pt with 10% decreased sensation in parts of V3 on the right, otherwise nonfocal. 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 Hx and exam consistent with TIA, etiology likely being large to small artery embolism from stenotic left ICA causing single episode of aphasia. Left partial V3 numbness does not correspond with any neurovascular territory, is not a stroke symptom and should not be considered when evaluating for CEA. NIHSS: 0. ABCD2 Score: 3. MRS: 0.    Recommendations:  - c/w home ASA  - Increase home Crestor 5mg HS to 20mg HS  - Start Plavix 75mg QD for 3 weeks  - CTA showing 70% left ICA stenosis  - Vascular on board  - CDU for b/l Carotid Dopplers in AM  - Follow up outpatient neurology, can call [phone] for appointment with Dr. Tapia

## 2019-05-10 NOTE — ED PROVIDER NOTE - CLINICAL SUMMARY MEDICAL DECISION MAKING FREE TEXT BOX
Jonathan Weil, PGY2 - word finding difficulty c/w TIA, mild numbness has been occurring chronically but could also have been preceding TIAs. Code stroke for rapid neuro eval and CT/CTA, but well outside window for tpa with low NIHSS, not a tPA candidate. 73 y old male with a history of hypertension ,CAD ,hld  came in to ER WITH - word finding difficulty c/w TIA, mild numbness has been occurring chronically but could also have been preceding TIAs. Code stroke for rapid neuro eval and CT/CTA, but well outside window for tpa with low NIHSS, not a tPA candidate. ZR

## 2019-05-11 DIAGNOSIS — I25.10 ATHEROSCLEROTIC HEART DISEASE OF NATIVE CORONARY ARTERY WITHOUT ANGINA PECTORIS: ICD-10-CM

## 2019-05-11 DIAGNOSIS — G45.9 TRANSIENT CEREBRAL ISCHEMIC ATTACK, UNSPECIFIED: ICD-10-CM

## 2019-05-11 DIAGNOSIS — I65.29 OCCLUSION AND STENOSIS OF UNSPECIFIED CAROTID ARTERY: ICD-10-CM

## 2019-05-11 LAB
APTT BLD: 67.9 SEC — HIGH (ref 27.5–36.3)
CHOLEST SERPL-MCNC: 141 MG/DL — SIGNIFICANT CHANGE UP (ref 10–199)
HBA1C BLD-MCNC: 5.7 % — HIGH (ref 4–5.6)
HCT VFR BLD CALC: 48.5 % — SIGNIFICANT CHANGE UP (ref 39–50)
HDLC SERPL-MCNC: 41 MG/DL — SIGNIFICANT CHANGE UP
HGB BLD-MCNC: 16.3 G/DL — SIGNIFICANT CHANGE UP (ref 13–17)
LIPID PNL WITH DIRECT LDL SERPL: 84 MG/DL — SIGNIFICANT CHANGE UP
MCHC RBC-ENTMCNC: 29.5 PG — SIGNIFICANT CHANGE UP (ref 27–34)
MCHC RBC-ENTMCNC: 33.6 GM/DL — SIGNIFICANT CHANGE UP (ref 32–36)
MCV RBC AUTO: 88 FL — SIGNIFICANT CHANGE UP (ref 80–100)
PLATELET # BLD AUTO: 239 K/UL — SIGNIFICANT CHANGE UP (ref 150–400)
RBC # BLD: 5.51 M/UL — SIGNIFICANT CHANGE UP (ref 4.2–5.8)
RBC # FLD: 13 % — SIGNIFICANT CHANGE UP (ref 10.3–14.5)
TOTAL CHOLESTEROL/HDL RATIO MEASUREMENT: 3.4 RATIO — SIGNIFICANT CHANGE UP (ref 3.4–9.6)
TRIGL SERPL-MCNC: 79 MG/DL — SIGNIFICANT CHANGE UP (ref 10–149)
WBC # BLD: 13 K/UL — HIGH (ref 3.8–10.5)
WBC # FLD AUTO: 13 K/UL — HIGH (ref 3.8–10.5)

## 2019-05-11 PROCEDURE — 93880 EXTRACRANIAL BILAT STUDY: CPT | Mod: 26

## 2019-05-11 PROCEDURE — 99217: CPT

## 2019-05-11 PROCEDURE — 70551 MRI BRAIN STEM W/O DYE: CPT | Mod: 26

## 2019-05-11 RX ORDER — ROSUVASTATIN CALCIUM 5 MG/1
1 TABLET ORAL
Qty: 0 | Refills: 0 | DISCHARGE

## 2019-05-11 RX ORDER — HEPARIN SODIUM 5000 [USP'U]/ML
INJECTION INTRAVENOUS; SUBCUTANEOUS
Qty: 25000 | Refills: 0 | Status: DISCONTINUED | OUTPATIENT
Start: 2019-05-11 | End: 2019-05-11

## 2019-05-11 RX ORDER — HEPARIN SODIUM 5000 [USP'U]/ML
1200 INJECTION INTRAVENOUS; SUBCUTANEOUS
Qty: 25000 | Refills: 0 | Status: DISCONTINUED | OUTPATIENT
Start: 2019-05-11 | End: 2019-05-12

## 2019-05-11 RX ORDER — HEPARIN SODIUM 5000 [USP'U]/ML
3500 INJECTION INTRAVENOUS; SUBCUTANEOUS EVERY 6 HOURS
Refills: 0 | Status: DISCONTINUED | OUTPATIENT
Start: 2019-05-11 | End: 2019-05-11

## 2019-05-11 RX ORDER — HEPARIN SODIUM 5000 [USP'U]/ML
7500 INJECTION INTRAVENOUS; SUBCUTANEOUS EVERY 6 HOURS
Refills: 0 | Status: DISCONTINUED | OUTPATIENT
Start: 2019-05-11 | End: 2019-05-11

## 2019-05-11 RX ORDER — ASPIRIN/CALCIUM CARB/MAGNESIUM 324 MG
81 TABLET ORAL DAILY
Refills: 0 | Status: DISCONTINUED | OUTPATIENT
Start: 2019-05-11 | End: 2019-05-15

## 2019-05-11 RX ADMIN — HEPARIN SODIUM 12 UNIT(S)/HR: 5000 INJECTION INTRAVENOUS; SUBCUTANEOUS at 20:34

## 2019-05-11 RX ADMIN — ATORVASTATIN CALCIUM 80 MILLIGRAM(S): 80 TABLET, FILM COATED ORAL at 21:04

## 2019-05-11 RX ADMIN — HEPARIN SODIUM 12 UNIT(S)/HR: 5000 INJECTION INTRAVENOUS; SUBCUTANEOUS at 15:08

## 2019-05-11 RX ADMIN — Medication 81 MILLIGRAM(S): at 13:26

## 2019-05-11 RX ADMIN — HEPARIN SODIUM 1700 UNIT(S)/HR: 5000 INJECTION INTRAVENOUS; SUBCUTANEOUS at 13:25

## 2019-05-11 NOTE — ED CDU PROVIDER DISPOSITION NOTE - LAB INTERPRETATION
**CDU ATTENDING ADDENDUM (Dr. Joe Salgado): Labs reviewed. Pertinent findings include: mild leukocytosis (significance uncertain)

## 2019-05-11 NOTE — H&P ADULT - NSICDXFAMILYHX_GEN_ALL_CORE_FT
FAMILY HISTORY:  Family history of cerebrovascular accident (CVA)    Father  Still living? No  Family history of coronary artery disease, Age at diagnosis: Age Unknown    Mother  Still living? No  Family history of CABG, Age at diagnosis: Age Unknown

## 2019-05-11 NOTE — ED CDU PROVIDER SUBSEQUENT DAY NOTE - ATTENDING CONTRIBUTION TO CARE
**CDU ATTENDING ADDENDUM (Dr. Joe Salgado): I attest that I have directly examined this patient and reviewed and formulated the diagnostic and therapeutic management plan in collaboration with the advanced practitioner (NP, PA). Please see MDM note and remainder of EMR for findings from CC, HPI, ROS, and PE. **CDU ATTENDING ADDENDUM (Dr. Joe Salgado): I attest that I have directly examined this patient and reviewed and formulated the diagnostic and therapeutic management plan in collaboration with the advanced practitioner (NP, PA). Please see MDM note and remainder of EMR for findings from CC, HPI, ROS, and PE. (Franklin/Della)

## 2019-05-11 NOTE — H&P ADULT - NSHPLABSRESULTS_GEN_ALL_CORE
< from: CT Angio Neck w/ IV Cont (05.10.19 @ 16:49) >    IMPRESSION:  Evaluation of the brain demonstrates a high attenuation focus in the left   centrum semiovale ovale suspicious for mineralization in association with   the possible cavernoma or an old area of hemorrhage or infection.  Evidence of a 70% stenosis of the left internal carotid artery with a   calcified plaque at its origin at the level of the carotid bulb. The   right internal carotid artery origin is unremarkable    < end of copied text >    < from: VA Duplex Carotid, Bilat (05.11.19 @ 11:58) >    Impression: Bulky calcified plaque at the origin of the left internal   carotid artery creating a hemodynamically significant stenosis. The   degree of luminal narrowing is estimated at 70-99% by diameter,   compatible with findings at recent MRA.    Mild calcific plaque at the origin of the right internal carotid artery   without duplex evidence of hemodynamically significant stenosis.    < end of copied text >

## 2019-05-11 NOTE — ED CDU PROVIDER SUBSEQUENT DAY NOTE - PHYSICAL EXAMINATION
**CDU ATTENDING ADDENDUM (Dr. Joe Salgado): I have reviewed and substantially contributed to the elements of the PE as documented above. I have directly performed an examination of this patient in conjunction with the other members (EM resident/PA/NP) of the patient care team.

## 2019-05-11 NOTE — H&P ADULT - HISTORY OF PRESENT ILLNESS
Pt is a 74 yo M with a PMH of HTN, HLD, CAD (s/p stent 2/2014), b/l Carotid Stenosis and GERD who is presenting with complaint of episode of "word finding difficulty" lasting 1.5 minutes this AM w/o recurrence. LKN 1000. Pt notes 10% decreased sensation on the right jaw line and lip corner which has been waxing and waning over the past several weeks. Otherwise pt denies prior word finding difficulties, recent fevers, chills, HA, dizziness, vision changes. NIHSS: 0. ABCD2 Score: 3. MRS: 0.

## 2019-05-11 NOTE — H&P ADULT - NSHPREVIEWOFSYSTEMS_GEN_ALL_CORE
CONSTITUTIONAL:   HEENT:  No visual loss, blurred vision, double vision.  No hearing loss, sneezing, congestion, runny nose or sore throat.  SKIN:  No rash or itching.  CARDIOVASCULAR:  No chest pain, chest pressure or chest discomfort. No palpitations or edema.  RESPIRATORY:  No shortness of breath, cough or sputum.  GASTROINTESTINAL:  No anorexia, nausea, vomiting or diarrhea. No abdominal pain.  GENITOURINARY:  No dysuria. No increased frequency. No retention. No incontinence.  NEUROLOGICAL:  See HPI  MUSCULOSKELETAL:  No muscle, back pain, joint pain or stiffness.  HEMATOLOGIC:  No anemia, bleeding or bruising.  ENDOCRINOLOGIC:  No reports of sweating, cold or heat intolerance. No polyuria or polydipsia.

## 2019-05-11 NOTE — ED CDU PROVIDER DISPOSITION NOTE - NSFOLLOWUPINSTRUCTIONS_ED_ALL_ED_FT
1. Continue your home medications as directed, except for Crestor. Stop Crestor 5mg and start Crestor 20mg. Start Plavix 75mg daily x 3 weeks.   2. Drink plenty of fluids to stay hydrated.  3. You will need to follow up with your PMD and neurologist or our neurology clinic and vascular clinic at [phone] in 2-3 days. A copy of your results were given to you to bring to your appt.   4. Return to ER for headache, confusion, behavior/speech changes, numbness/tingling/weakness in your arms/legs, or any other concerns.

## 2019-05-11 NOTE — ED CDU PROVIDER SUBSEQUENT DAY NOTE - PROGRESS NOTE DETAILS
CDU NOTE ANUP TYSON: Pt resting comfortably, feeling well without complaint. NAD, VSS. sinus lali on telemetry. neuro exam normal, no deficits. will continue monitoring. Patient seen at bedside in NAD.  VSS.  Patient resting comfortably without complaints. Patient reports that word finding difficulties have improved.  Ambulating without assistance.  No focal deficits on exam.  Will await carotid duplex and final neuro recs.  -Gera Hull PA-C Patient seen at bedside in NAD.  VSS.  Patient resting comfortably without complaints. Patient reports that word finding difficulties have improved.  Ambulating without assistance.  No focal deficits on exam.  Will await carotid duplex and final neuro recs.  -Gera Hull PA-C  **CDU ATTENDING ADDENDUM (Dr. Joe Salgado): patient serially evaluated throughout CDU course. NO acute deterioration up to this time in the CDU. Agree with above notation by VICTORIA Hull. Will continue to observe and monitor closely. CDU diagnostics and consultation(s) (if applicable) up to this time have been reviewed, acknowledged and noted. Disposition pending. Will continue to observe and monitor closely. Patient seen at bedside in NAD.  VSS.  Patient resting comfortably without complaints. Patient reports that word finding difficulties have improved.  Ambulating without assistance.  No focal deficits on exam.  Will await carotid duplex and final neuro recs.  -Gera Hull PA-C  **CDU ATTENDING ADDENDUM (Dr. Joe Salgado): patient serially evaluated throughout CDU course. NO acute deterioration up to this time in the CDU. Agree with above notation by VICTORIA Hull. CDU diagnostics and consultation(s) (if applicable) up to this time have been reviewed, acknowledged and noted. Disposition pending. Will continue to observe and monitor closely. Patient seen at bedside in NAD.  VSS.  Patient resting comfortably without complaints. Carotid duplex showing 70-99% occlusion L ICA.  neurology consult and recommending admission for further work up.  -Gear Hull PA-C Patient seen at bedside in NAD.  VSS.  Patient resting comfortably without complaints. Carotid duplex showing 70-99% occlusion L ICA.  neurology consult and recommending MRI, heparin drip (no Bolus) and admission for further work up.  -Gera Hull PA-C Patient seen at bedside in NAD.  VSS.  Patient resting comfortably without complaints. Carotid duplex showing 70-99% occlusion L ICA.  neurology consult and recommending MRI, heparin drip (no Bolus) and admission for further work up.  -Gera Hull PA-C  **CDU ATTENDING ADDENDUM (Dr. Joe Salgado): patient serially evaluated throughout CDU course. NO acute deterioration up to this time in the CDU. CDU diagnostics and consultation(s) (if applicable) up to this time have been reviewed, acknowledged and noted. Case reviewed with Neurology. Based on findings of carotid US, agree with admission for further inpatient observation, serial reevaluations, and diagnostic (MRI, MRA) and therapeutic intensity. Will continue to observe and monitor closely until definitive placement is made.

## 2019-05-11 NOTE — H&P ADULT - NSHPPHYSICALEXAM_GEN_ALL_CORE
General Exam:   General appearance: No acute distress             Neurological Exam:  Mental Status: Orientated to self, date and place.  Attention intact.  No dysarthria. Speech fluent.  Cranial Nerves:   PERRL, EOMI, VFF, no nystagmus. 10% decreased sensation to light touch in part of right CN V3.  No facial asymmetry.  Hearing intact to finger rub bilaterally.  Tongue, uvula and palate midline.  Sternocleidomastoid and Trapezius intact bilaterally.    Motor:   Tone: normal.                  Strength:     [] Upper extremity                      Delt       Bicep    Tricep                                                  R         5/5 5/5 5/5 5/5                                               L          5/5 5/5 5/5 5/5  [] Lower extremity                       HF          KE          KF        DF         PF                                               R        5/5 5/5 5/5 5/5 5/5                                               L         5/5 5/5 5/5 5/5        5/5  Pronator drift: none                 Dysmetria: None to finger-nose-finger b/l  No truncal ataxia.    Tremor: No resting, postural or action tremor.  No myoclonus.    Sensation: intact to light touch throughout, no extinction    Toes downgoing b/l    Gait: normal.

## 2019-05-11 NOTE — H&P ADULT - NSICDXPASTMEDICALHX_GEN_ALL_CORE_FT
PAST MEDICAL HISTORY:  Carpal tunnel syndrome, right     Coronary artery disease stent placed 2014    Dyslipidemia     Hiatal hernia with GERD     Hypertension

## 2019-05-11 NOTE — ED CDU PROVIDER DISPOSITION NOTE - PLAN OF CARE
**CDU ATTENDING ADDENDUM (Dr. Joe Salgado): Goals of care include resolution of emergent/urgent symptoms and concerns, and restoration to baseline level of homeostasis.

## 2019-05-11 NOTE — ED CDU PROVIDER SUBSEQUENT DAY NOTE - MEDICAL DECISION MAKING DETAILS
**CDU ATTENDING MEDICAL DECISION MAKING/SYNTHESIS (Dr. Joe Salgado): I have reviewed the Chief Complaint, the HPI, the ROS, and have directly performed and confirmed the findings on the Physical Examination. I have reviewed the medical decision making with all providers, as applicable. The PROBLEM REPRESENTATION at this time is:   The MOST LIKELY DIAGNOSIS, and the LIST OF DIFFERENTIAL DIAGNOSES, includes (but is not limited to) the following: XX.  The likelihood of each of these diagnoses has been appropriately considered in the context of this patient's presentation and my evaluation. PLAN: as described in EMR, including diagnostics, therapeutics and consultation as clinically warranted. I will continue to reevaluate the patient, including the results of all testing, and monitor response to therapy throughout the patient's course in the CDU. **CDU ATTENDING MEDICAL DECISION MAKING/SYNTHESIS (Dr. Joe Salgado): I have reviewed the Chief Complaint, the HPI, the ROS, and have directly performed and confirmed the findings on the Physical Examination. I have reviewed the medical decision making with all providers, as applicable. The PROBLEM REPRESENTATION at this time is: 73-year-old man with complex medical history including Hypertension, hyperlipidemia, Coronary Artery Disease (Stent), and gastroesophageal reflux disease to CDU for therapeutic intensity for transient episode of word finding difficulty while speaking now resolved. The MOST LIKELY DIAGNOSIS, and the LIST OF DIFFERENTIAL DIAGNOSES, includes (but is not limited to) the following: cerebrovascular accident, transient ischemic attack, vertebrobasilar insufficiency or equivalent, mass/tumor, serious bacterial infection or sepsis/severe sepsis e.g. meningococcemia, meningitis, encephalitis, or equivalent, dehydration, electrolyte-metabolic-endocrine derangements, vascular cause e.g. carotid dissection or equivalent, demyelinating disorder. The likelihood of each of these diagnoses has been appropriately considered in the context of this patient's presentation and my evaluation. PLAN: as described in EMR, including diagnostics, therapeutics and consultation as clinically warranted. I will continue to reevaluate the patient, including the results of all testing, and monitor response to therapy throughout the patient's course in the CDU.

## 2019-05-11 NOTE — CONSULT NOTE ADULT - SUBJECTIVE AND OBJECTIVE BOX
CHIEF COMPLAINT: CVA     HISTORY OF PRESENT ILLNESS:   74 yo M with a PMH of HTN, HLD, CAD (s/p stent 2/2014), b/l Carotid Stenosis and GERD who is presenting with complaint of episode of "word finding difficulty" lasting 1.5 minutes this AM w/o recurrence. LKN 1000. Pt notes 10% decreased sensation on the right jaw line and lip corner which has been waxing and waning over the past several weeks. Otherwise pt denies prior word finding difficulties, recent fevers, chills, HA, dizziness, vision changes. NIHSS: 0. ABCD2 Score: 3. MRS: 0.    Found to have severe L ICA stenosis. Being scheduled for possible urgent CEA tomorrow.   Denies chest pain , shortness of breath, or palpitations. Had PCI due to symptoms of exertional shortness of breath in 2014. has been stable since. Had his last SPECT in 2017 which was negative., States to be active on a daily basis.       PAST MEDICAL & SURGICAL HISTORY:  Dyslipidemia  Hiatal hernia with GERD  Hypertension  Carpal tunnel syndrome, right  Coronary artery disease: stent placed 2014  Pilonidal cyst  S/P angioplasty with stent: 2014          MEDICATIONS:  aspirin  chewable 81 milliGRAM(s) Oral daily  heparin  Infusion 1200 Unit(s)/Hr IV Continuous <Continuous>            atorvastatin 80 milliGRAM(s) Oral at bedtime        FAMILY HISTORY:  Family history of coronary artery disease  Family history of cerebrovascular accident (CVA)  Family history of CABG      SOCIAL HISTORY:    [ ] Non-smoker  [ ] Smoker  [ ] Alcohol    Allergies    amoxicillin (Hives)  penicillin (Hives)    Intolerances    	    REVIEW OF SYSTEMS:  CONSTITUTIONAL: No fever, weight loss, +fatigue  EYES: No eye pain, visual disturbances, or discharge  ENMT:  No difficulty hearing, tinnitus, vertigo; No sinus or throat pain  NECK: No pain or stiffness  RESPIRATORY: No cough, wheezing, chills or hemoptysis; No Shortness of Breath  CARDIOVASCULAR: No chest pain, palpitations, passing out, dizziness, or leg swelling  GASTROINTESTINAL: No abdominal or epigastric pain. No nausea, vomiting, or hematemesis; No diarrhea or constipation. No melena or hematochezia.  GENITOURINARY: No dysuria, frequency, hematuria, or incontinence  NEUROLOGICAL: No headaches, memory loss,++ loss of strength, numbness, or tremors  SKIN: No itching, burning, rashes, or lesions   LYMPH Nodes: No enlarged glands  ENDOCRINE: No heat or cold intolerance; No hair loss  MUSCULOSKELETAL: + joint pain or swelling; No muscle, back, or extremity pain  PSYCHIATRIC: No depression, anxiety, mood swings, or difficulty sleeping  HEME/LYMPH: No easy bruising, or bleeding gums  ALLERY AND IMMUNOLOGIC: No hives or eczema	    [ ] All others negative	  [ ] Unable to obtain    PHYSICAL EXAM:  T(C): 37.3 (05-11-19 @ 19:33), Max: 37.4 (05-11-19 @ 12:30)  HR: 66 (05-11-19 @ 19:33) (54 - 74)  BP: 162/73 (05-11-19 @ 19:33) (149/70 - 168/81)  RR: 18 (05-11-19 @ 19:33) (16 - 18)  SpO2: 94% (05-11-19 @ 19:33) (94% - 96%)  Wt(kg): --  I&O's Summary      Appearance: NAD	  HEENT:   Normal oral mucosa, PERRL, EOMI	  Lymphatic: No lymphadenopathy  Cardiovascular: Normal S1 S2, No JVD, No murmurs, No edema  Respiratory: Lungs clear to auscultation	  Psychiatry: A & O x 3, Mood & affect appropriate  Gastrointestinal:  Soft, Non-tender, + BS	  Skin: No rashes, No ecchymoses, No cyanosis	  Neurologic: Non-focal  Extremities: Normal range of motion, No clubbing, cyanosis or edema  Vascular: Peripheral pulses palpable 2+ bilaterally    TELEMETRY: 	  SR  ECG:  	NSR, PACs< non specific stt changes   RADIOLOGY:      < from: [facility] Duplex Carotid [place] (05.11.19 @ 11:58) >    EXAM:  CAROTID DUPLEX BILATERAL                            PROCEDURE DATE:  05/11/2019            INTERPRETATION:  Clinical information: Status post CVA, left ICA stenosis   by MRA.    Comparison: MRA of the head and neck dated 5/10/2019.    Findings: There is minimal calcified plaque at the right carotid   bifurcation. Bulky calcified plaque is present in the left carotid bulb   and bifurcation. Disturbed color flow and elevated blood flow velocities   are detected in the proximal left internal and external carotid arteries.   Antegrade flow is present in both vertebral arteries.      Blood flow velocities are as follows:    RIGHT:    PROX CCA = 86 ;  DIST CCA = 77 ;  PROX ICA = 64 ;  DIST ICA =   54 ;  ECA = 104    LEFT   :    PROX CCA = 112 ;  DIST CCA = 91 ;  PROX ICA = 321 ;  DIST ICA   = 64 ;  ECA = 202    Impression: Bulky calcified plaque at the origin of the left internal   carotid artery creating a hemodynamically significant stenosis. The   degree of luminal narrowing is estimated at 70-99% by diameter,   compatible with findings at recent MRA.    Mild calcific plaque at the origin of the right internal carotid artery   without duplex evidence of hemodynamically significant stenosis.    The measurement of carotid stenosisis based on velocity parameters that   correlate the residual internal carotid diameter with that of the more   distal vessel in accordance with a method such as the [place] American   Symptomatic Carotid Endarterectomy Trial (NASCET).                        KIN EMMANUEL M.D., ATTENDING RADIOLOGIST  This document has been electronically signed. May 11 2019 12:11PM                < end of copied text >      < from: CT Angio Neck w/ IV Cont (05.10.19 @ 16:49) >    EXAM:  CT ANGIO BRAIN (W)AW IC                          EXAM:  CT BRAIN STROKE PROTOCOL                          EXAM:  CT ANGIO NECK (W)AW IC                            PROCEDURE DATE:  05/10/2019            INTERPRETATION:  HISTORY: Transient slurred speech, resolved  .    TECHNIQUE:Axial sections were obtained from base to vertex without   contrast. CT angiography of the extracranial and intracranial circulation   was performed.    On the noncontrast evaluation of the brain ventricular system and sulcal   pattern are within normal limits for the patient's stated age. No major   vessel distribution infarct.    There is a focus of high attenuation in the left centrum semiovale ovale   white matter without surrounding edema suspicious for possible   calcification related to a cavernous malformation or alternatively an   area of prior infection or hemorrhage. No evidence of acute pathology.    Post contrast helically acquired thin section axial images through the   extracranial and intracranial circulation were retro-reformatted into   thinner sections up with coronal and sagittal reformations were made. 140   cc of Omnipaque 300 were utilized 10 cc discarded    Using a separate workstation, 3-D shaded surface reformations were made   of intracranial and extracranial vasculature. 3-D reformations were   reviewed and included in interpretation of the official report.    Additionally, maximal intensity projection images were additionally   included and reviewed.      COMPARISON: None.    FINDINGS:    The aortic arch and origins of the great vessels are within normal limits.    Right: The origin of the right common carotid artery is normal. The right   common carotid artery is normal in course and caliber to the carotid   bifurcation. Origins of the internal and external carotid arteries are   normal. The right internal carotid artery is normal thereafter in course   and caliber to the intracranial segment.    The intracranial internal carotid artery is normal to the [place]   [place]. There is a hypoplastic right A1 segment of the anterior cerebral,   normal anatomic variant  There is a hypoplastic distal right vertebral segment..    Branch vasculature of the posterior circulation is within normal limits.   Fetal origin of the left PCA from the left internal carotid artery    There is no evidence for aneurysm, stenosis, or vascular malformation.      Left: The origin of the left common carotid artery is normal. The left   common carotid artery is normal in course and caliber to the carotid   bifurcation. 70% stenosis at the origin of the left internal carotid   artery is suggested based on NASCETcriteria. The intracranial internal   carotid artery is normal to the [place]. The anterior middle   cerebral arteries demonstrate normal enhancement and arborization   symmetric fat on the right.    The origin of the left vertebral artery is normal. The left vertebral   artery is normal in course in caliber to intradural segment and   vertebrobasilar confluence.    There is no evidence for aneurysm, stenosis, or vascular malformation.        The visualized major dural venous sinuses and deep cerebral venous   structures demonstrate normal enhancement.    There is no definite evidence for enhancement abnormality within the   brain parenchyma.        IMPRESSION:  Evaluation of the brain demonstrates a high attenuation focus in the left   centrum semiovale ovale suspicious for mineralization in association with   the possible cavernoma or an old area of hemorrhage or infection.  Evidence of a 70% stenosis of the left internal carotid artery with a   calcified plaque at its origin at the level of the carotid bulb. The   right internal carotid artery origin is unremarkable    Dr. Che discussed these findings with Hellen Theodore on 5/10/2019 5:01   PM with read back.                    AGUSTO CHE M.D., ATTENDING RADIOLOGIST  This document has been electronically signed. May 10 2019  5:34PM                < end of copied text >    OTHER: 	  	  LABS:	 	    CARDIAC MARKERS:                                  16.3   13.0  )-----------( 239      ( 11 May 2019 19:56 )             48.5     05-10    140  |  103  |  18  ----------------------------<  92  3.9   |  27  |  0.88    Ca    9.5      10 May 2019 16:36    TPro  7.2  /  Alb  4.4  /  TBili  0.6  /  DBili  x   /  AST  21  /  ALT  24  /  AlkPhos  52  05-10    proBNP:   Lipid Profile:   HgA1c: Hemoglobin A1C, Whole Blood: 5.7 % (05-11 @ 10:17)    TSH:

## 2019-05-11 NOTE — CHART NOTE - NSCHARTNOTEFT_GEN_A_CORE
Pt is a 72 yo M with a PMH of HTN, HLD, CAD (s/p stent 2/2014), admitted after TIA. Patient had "loss of words" which has persisted since yesterday, intermittent slurring of words and intermittent numbness in RUE. CTA done earlier demonstrated L ICA stenosis with tight lesion and duplex correlated with severe left ICA stenosis. Patient concerning for crescendo TIA.     CN II-XII intact  Motor and sensory intact     All imaging reviewed  All labs reviewed       A/P:   73M with TIAs, concerning for crescendo TIAs with severe left carotid stenosis. Patient will need left CEA, if symptoms recur or worsen overnight will plan for CEA tomorrow.    - Continue heparin gtt with PTT between 60-90  - ECHO stat  - MRI   - Cardiology for risk stratification   - Keep NPO after midnight    Patient seen and discussed with Dr. Ahumada

## 2019-05-11 NOTE — CONSULT NOTE ADULT - PROBLEM SELECTOR RECOMMENDATION 9
Severe L ICA stenosis   Vascular surgery following   Check TTE. If normal LVEF as expected, may proceed with acceptable cardiac risk.   Heparin gtt

## 2019-05-11 NOTE — ED CDU PROVIDER DISPOSITION NOTE - CLINICAL COURSE
74 y/o M with a PMH of HTN, HLD, CAD (s/p stent 2/2019) and GERD c/o word finding difficulty x 1 day. pt states he could not come up with the words he wanted to say this AM around 10. states it was severe and lasted 1.5 minutes. after that pt had intermittent recurrences throughout the day that were less severe. Pt also notes mildly decreased sensation on the right jaw line, right face, and right side of his lips intermittently x 2 months that recurred today at the same time as the word finding difficulty. pt also notes having decreased balance intermittently x 2 months, no falls. pt denies focal weakness, tingling, headache, vision changes, fever, chills, sweats, CP, SOB, abd pain, back pain, problems going to the bathroom.   In ED, WBC 11.1, other labs unremarkable, CXR negative, CTH showed high attenuation focus L centrum semiovale could be cavernoma or old hemorhage/infxn, CTA Head negative, CTA neck + with 70% stenosis of the L internal carotid artery. neuro and vascular c/s'd, recommended carotid duplex. pt sent to CDU for continued monitoring and work up.  In CDU, _____________ 72 y/o M with a PMH of HTN, HLD, CAD (s/p stent 2/2019) and GERD c/o word finding difficulty x 1 day. pt states he could not come up with the words he wanted to say this AM around 10. states it was severe and lasted 1.5 minutes. after that pt had intermittent recurrences throughout the day that were less severe. Pt also notes mildly decreased sensation on the right jaw line, right face, and right side of his lips intermittently x 2 months that recurred today at the same time as the word finding difficulty. pt also notes having decreased balance intermittently x 2 months, no falls. pt denies focal weakness, tingling, headache, vision changes, fever, chills, sweats, CP, SOB, abd pain, back pain, problems going to the bathroom.   In ED, WBC 11.1, other labs unremarkable, CXR negative, CTH showed high attenuation focus L centrum semiovale could be cavernoma or old hemorhage/infxn, CTA Head negative, CTA neck + with 70% stenosis of the L internal carotid artery. neuro and vascular c/s'd, recommended carotid duplex. pt sent to CDU for continued monitoring and work up.  In CDU, carotid doppler showing 70-99% stenosis of Left ICA.  neuro recommending heparin drip and admission for further work up.

## 2019-05-11 NOTE — PATIENT PROFILE ADULT - CAREGIVER ADDRESS
[address] [facility] [place], [facility] [address]

## 2019-05-11 NOTE — ED CDU PROVIDER SUBSEQUENT DAY NOTE - HISTORY
No interval changes since initial CDU provider note. Pt feels well without complaint. NAD VSS. no events on tele. neuro exam normal, no deficits. pt to get carotid duplex. neuro and vascular following. will continue monitoring. Davida Reid

## 2019-05-11 NOTE — H&P ADULT - ASSESSMENT
Pt is a 72 yo M with a PMH of HTN, HLD, CAD (s/p stent 2/2014), b/l Carotid Stenosis and GERD who is presenting with complaint of episode of "word finding difficulty" lasting 1.5 minutes this AM w/o recurrence. LKN 1000. On exam pt with 10% decreased sensation in parts of V3 on the right, otherwise nonfocal. 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 Hx and exam consistent with TIA, etiology likely being large to small artery embolism from stenotic left ICA causing single episode of aphasia. Left partial V3 numbness does not correspond with any neurovascular territory, is not a stroke symptom and should not be considered when evaluating for CEA. NIHSS: 0. ABCD2 Score: 3. MRS: 0.    Carotid duplex showed 70-99% L ICA stenosis    Plan:  Admit to stroke service  Continue aspirin 81 mg daily  Discontinue home plavix  Lipitor 40 mg qhs  Start heparin gtt; no bolus; goal ptt 60-80  MRI brain without contrast  Vascular surgery following; follow-up recommendations Pt is a 72 yo M with a PMH of HTN, HLD, CAD (s/p stent 2/2014), b/l Carotid Stenosis and GERD who is presenting with complaint of episode of "word finding difficulty" lasting 1.5 minutes this AM w/o recurrence. LKN 1000. On exam pt with 10% decreased sensation in parts of V3 on the right, otherwise nonfocal. 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 Hx and exam consistent with TIA, etiology likely being large to small artery embolism from stenotic left ICA causing single episode of aphasia. Left partial V3 numbness does not correspond with any neurovascular territory, is not a stroke symptom and should not be considered when evaluating for CEA. NIHSS: 0. ABCD2 Score: 3. MRS: 0.    Carotid duplex showed 70-99% L ICA stenosis    Plan:  Admit to stroke service  Continue aspirin 81 mg daily  Discontinue home plavix  Lipitor 80 mg qhs  Start heparin gtt; no bolus; goal ptt 60-80  MRI brain without contrast  Vascular surgery following; follow-up recommendations

## 2019-05-12 LAB
APTT BLD: 76.5 SEC — HIGH (ref 27.5–36.3)
APTT BLD: 83.5 SEC — HIGH (ref 27.5–36.3)
BLD GP AB SCN SERPL QL: NEGATIVE — SIGNIFICANT CHANGE UP
BUN SERPL-MCNC: 17 MG/DL — SIGNIFICANT CHANGE UP (ref 7–23)
CALCIUM SERPL-MCNC: 9.7 MG/DL — SIGNIFICANT CHANGE UP (ref 8.4–10.5)
CHLORIDE SERPL-SCNC: 100 MMOL/L — SIGNIFICANT CHANGE UP (ref 96–108)
CO2 SERPL-SCNC: 24 MMOL/L — SIGNIFICANT CHANGE UP (ref 22–31)
CREAT SERPL-MCNC: 0.84 MG/DL — SIGNIFICANT CHANGE UP (ref 0.5–1.3)
GLUCOSE SERPL-MCNC: 105 MG/DL — HIGH (ref 70–99)
HCT VFR BLD CALC: 48.4 % — SIGNIFICANT CHANGE UP (ref 39–50)
HGB BLD-MCNC: 15.7 G/DL — SIGNIFICANT CHANGE UP (ref 13–17)
INR BLD: 1.13 RATIO — SIGNIFICANT CHANGE UP (ref 0.88–1.16)
MCHC RBC-ENTMCNC: 28.8 PG — SIGNIFICANT CHANGE UP (ref 27–34)
MCHC RBC-ENTMCNC: 32.5 GM/DL — SIGNIFICANT CHANGE UP (ref 32–36)
MCV RBC AUTO: 88.7 FL — SIGNIFICANT CHANGE UP (ref 80–100)
PLATELET # BLD AUTO: 212 K/UL — SIGNIFICANT CHANGE UP (ref 150–400)
POTASSIUM SERPL-MCNC: 3.6 MMOL/L — SIGNIFICANT CHANGE UP (ref 3.5–5.3)
POTASSIUM SERPL-SCNC: 3.6 MMOL/L — SIGNIFICANT CHANGE UP (ref 3.5–5.3)
PROTHROM AB SERPL-ACNC: 12.9 SEC — SIGNIFICANT CHANGE UP (ref 10–12.9)
RBC # BLD: 5.46 M/UL — SIGNIFICANT CHANGE UP (ref 4.2–5.8)
RBC # FLD: 13.2 % — SIGNIFICANT CHANGE UP (ref 10.3–14.5)
RH IG SCN BLD-IMP: POSITIVE — SIGNIFICANT CHANGE UP
SODIUM SERPL-SCNC: 136 MMOL/L — SIGNIFICANT CHANGE UP (ref 135–145)
WBC # BLD: 10.1 K/UL — SIGNIFICANT CHANGE UP (ref 3.8–10.5)
WBC # FLD AUTO: 10.1 K/UL — SIGNIFICANT CHANGE UP (ref 3.8–10.5)

## 2019-05-12 PROCEDURE — 93306 TTE W/DOPPLER COMPLETE: CPT | Mod: 26

## 2019-05-12 RX ORDER — HEPARIN SODIUM 5000 [USP'U]/ML
1200 INJECTION INTRAVENOUS; SUBCUTANEOUS
Qty: 25000 | Refills: 0 | Status: DISCONTINUED | OUTPATIENT
Start: 2019-05-12 | End: 2019-05-13

## 2019-05-12 RX ADMIN — HEPARIN SODIUM 12 UNIT(S)/HR: 5000 INJECTION INTRAVENOUS; SUBCUTANEOUS at 02:16

## 2019-05-12 RX ADMIN — ATORVASTATIN CALCIUM 80 MILLIGRAM(S): 80 TABLET, FILM COATED ORAL at 21:14

## 2019-05-12 RX ADMIN — Medication 81 MILLIGRAM(S): at 11:24

## 2019-05-12 NOTE — PROGRESS NOTE ADULT - SUBJECTIVE AND OBJECTIVE BOX
Subjective: Patient seen and examined. No new events except as noted.   feels ok   no cp or sob     REVIEW OF SYSTEMS:    CONSTITUTIONAL: No weakness, fevers or chills  EYES/ENT: No visual changes;  No vertigo or throat pain   NECK: No pain or stiffness  RESPIRATORY: No cough, wheezing, hemoptysis; No shortness of breath  CARDIOVASCULAR: No chest pain or palpitations  GASTROINTESTINAL: No abdominal or epigastric pain. No nausea, vomiting, or hematemesis; No diarrhea or constipation. No melena or hematochezia.  GENITOURINARY: No dysuria, frequency or hematuria  NEUROLOGICAL: No numbness or weakness  SKIN: No itching, burning, rashes, or lesions   All other review of systems is negative unless indicated above.    MEDICATIONS:  MEDICATIONS  (STANDING):  aspirin  chewable 81 milliGRAM(s) Oral daily  atorvastatin 80 milliGRAM(s) Oral at bedtime  heparin  Infusion 1200 Unit(s)/Hr (12 mL/Hr) IV Continuous <Continuous>      PHYSICAL EXAM:  T(C): 36.8 (05-12-19 @ 05:17), Max: 37.4 (05-11-19 @ 12:30)  HR: 55 (05-12-19 @ 05:17) (55 - 74)  BP: 143/73 (05-12-19 @ 05:17) (143/73 - 168/81)  RR: 18 (05-12-19 @ 05:17) (16 - 18)  SpO2: 95% (05-12-19 @ 05:17) (93% - 96%)  Wt(kg): --  I&O's Summary    11 May 2019 07:01  -  12 May 2019 07:00  --------------------------------------------------------  IN: 144 mL / OUT: 0 mL / NET: 144 mL      Height (cm): 190.5 (05-11 @ 14:43)  Weight (kg): 95.2 (05-11 @ 13:01)  BMI (kg/m2): 26.2 (05-11 @ 14:43)  BSA (m2): 2.24 (05-11 @ 14:43)    Appearance: Normal	  HEENT:   Normal oral mucosa, PERRL, EOMI	  Lymphatic: No lymphadenopathy , no edema  Cardiovascular: Normal S1 S2, No JVD, No murmurs , Peripheral pulses palpable 2+ bilaterally  Respiratory: Lungs clear to auscultation, normal effort 	  Gastrointestinal:  Soft, Non-tender, + BS	  Skin: No rashes, No ecchymoses, No cyanosis, warm to touch  Musculoskeletal: Normal range of motion, normal strength  Psychiatry:  Mood & affect appropriate  Ext: No edema      LABS:    CARDIAC MARKERS:                                15.7   10.1  )-----------( 212      ( 12 May 2019 02:15 )             48.4     05-12    136  |  100  |  17  ----------------------------<  105<H>  3.6   |  24  |  0.84    Ca    9.7      12 May 2019 02:15    TPro  7.2  /  Alb  4.4  /  TBili  0.6  /  DBili  x   /  AST  21  /  ALT  24  /  AlkPhos  52  05-10    proBNP:   Lipid Profile:   HgA1c:   TSH:             TELEMETRY: 	SR    ECG:  	  RADIOLOGY:   DIAGNOSTIC TESTING:  [ ] Echocardiogram:    [ ]  Catheterization:  [ ] Stress Test:    OTHER:

## 2019-05-12 NOTE — PROGRESS NOTE ADULT - SUBJECTIVE AND OBJECTIVE BOX
Vascular Surgery Progress Note     Subjective: Patient was seen and examined during morning rounds. C/o of some difficulty with word finding. But no new neurologic symptoms. Pt denies f/c, n/v, SOB, CP/ABP, lightheadedness.       Objective:    PHYSICAL EXAM:   General: NAD  HEENT: Normocephalic, atraumatic, EOMI, PEERLA.  Cardiac: RRR  Respiratory: Breathing comfortably on RA   Abdomen: Soft, non-distended, non-tender   Groin: Normal appearing  Ext: moving all ext warm  Neuro: CN II-XII intact. Strength and sensation intact in bilateral upper and lower ext.         MEDICATIONS  (STANDING):  aspirin  chewable 81 milliGRAM(s) Oral daily  atorvastatin 80 milliGRAM(s) Oral at bedtime  heparin  Infusion 1200 Unit(s)/Hr (12 mL/Hr) IV Continuous <Continuous>    MEDICATIONS  (PRN):      Vital Signs Last 24 Hrs  T(C): 36.8 (12 May 2019 05:17), Max: 37.4 (11 May 2019 12:30)  T(F): 98.3 (12 May 2019 05:17), Max: 99.4 (11 May 2019 12:30)  HR: 55 (12 May 2019 05:17) (55 - 74)  BP: 143/73 (12 May 2019 05:17) (143/73 - 168/81)  BP(mean): --  RR: 18 (12 May 2019 05:17) (16 - 18)  SpO2: 95% (12 May 2019 05:17) (93% - 96%)      05-11-19 @ 07:01  -  05-12-19 @ 07:00  --------------------------------------------------------  IN: 144 mL / OUT: 0 mL / NET: 144 mL        LABS:                        15.7   10.1  )-----------( 212      ( 12 May 2019 02:15 )             48.4     05-12    136  |  100  |  17  ----------------------------<  105<H>  3.6   |  24  |  0.84    Ca    9.7      12 May 2019 02:15    TPro  7.2  /  Alb  4.4  /  TBili  0.6  /  DBili  x   /  AST  21  /  ALT  24  /  AlkPhos  52  05-10

## 2019-05-12 NOTE — PROGRESS NOTE ADULT - ASSESSMENT
ASSESSMENT: Patient is a 73M w/ PMHx as noted with months of intermittent right sided facial numbness and today brief moment of "word finding difficulty"  now resolved with CTA of neck revealing 70% occlusion of L ICA.     PLAN:    - CEA planning for this week  - f/u TTE  - f/u MRI official read  - Dispo as per neurology    Vascular Sx [id number]

## 2019-05-13 LAB
APTT BLD: 63.5 SEC — HIGH (ref 27.5–36.3)
APTT BLD: 84.8 SEC — HIGH (ref 27.5–36.3)
APTT BLD: 87.2 SEC — HIGH (ref 27.5–36.3)
HCT VFR BLD CALC: 49.1 % — SIGNIFICANT CHANGE UP (ref 39–50)
HGB BLD-MCNC: 15.8 G/DL — SIGNIFICANT CHANGE UP (ref 13–17)
MCHC RBC-ENTMCNC: 28.2 PG — SIGNIFICANT CHANGE UP (ref 27–34)
MCHC RBC-ENTMCNC: 32.2 GM/DL — SIGNIFICANT CHANGE UP (ref 32–36)
MCV RBC AUTO: 87.7 FL — SIGNIFICANT CHANGE UP (ref 80–100)
PLATELET # BLD AUTO: 179 K/UL — SIGNIFICANT CHANGE UP (ref 150–400)
RBC # BLD: 5.6 M/UL — SIGNIFICANT CHANGE UP (ref 4.2–5.8)
RBC # FLD: 14.1 % — SIGNIFICANT CHANGE UP (ref 10.3–14.5)
WBC # BLD: 8.01 K/UL — SIGNIFICANT CHANGE UP (ref 3.8–10.5)
WBC # FLD AUTO: 8.01 K/UL — SIGNIFICANT CHANGE UP (ref 3.8–10.5)

## 2019-05-13 PROCEDURE — 99233 SBSQ HOSP IP/OBS HIGH 50: CPT

## 2019-05-13 RX ORDER — HEPARIN SODIUM 5000 [USP'U]/ML
1100 INJECTION INTRAVENOUS; SUBCUTANEOUS
Qty: 25000 | Refills: 0 | Status: DISCONTINUED | OUTPATIENT
Start: 2019-05-13 | End: 2019-05-15

## 2019-05-13 RX ORDER — SODIUM CHLORIDE 9 MG/ML
1000 INJECTION INTRAMUSCULAR; INTRAVENOUS; SUBCUTANEOUS
Refills: 0 | Status: DISCONTINUED | OUTPATIENT
Start: 2019-05-13 | End: 2019-05-15

## 2019-05-13 RX ADMIN — HEPARIN SODIUM 11 UNIT(S)/HR: 5000 INJECTION INTRAVENOUS; SUBCUTANEOUS at 15:00

## 2019-05-13 RX ADMIN — ATORVASTATIN CALCIUM 80 MILLIGRAM(S): 80 TABLET, FILM COATED ORAL at 21:17

## 2019-05-13 RX ADMIN — Medication 81 MILLIGRAM(S): at 15:00

## 2019-05-13 NOTE — PROGRESS NOTE ADULT - SUBJECTIVE AND OBJECTIVE BOX
Subjective: Patient seen and examined. No new events except as noted.   feels ok   walking in us with PT       REVIEW OF SYSTEMS:    CONSTITUTIONAL: + weakness, fevers or chills  EYES/ENT: No visual changes;  No vertigo or throat pain   NECK: No pain or stiffness  RESPIRATORY: No cough, wheezing, hemoptysis; No shortness of breath  CARDIOVASCULAR: No chest pain or palpitations  GASTROINTESTINAL: No abdominal or epigastric pain. No nausea, vomiting, or hematemesis; No diarrhea or constipation. No melena or hematochezia.  GENITOURINARY: No dysuria, frequency or hematuria  NEUROLOGICAL: No numbness or weakness  SKIN: No itching, burning, rashes, or lesions   All other review of systems is negative unless indicated above.    MEDICATIONS:  MEDICATIONS  (STANDING):  aspirin  chewable 81 milliGRAM(s) Oral daily  atorvastatin 80 milliGRAM(s) Oral at bedtime  heparin  Infusion 1200 Unit(s)/Hr (12 mL/Hr) IV Continuous <Continuous>      PHYSICAL EXAM:  T(C): 37.1 (05-13-19 @ 08:23), Max: 37.3 (05-12-19 @ 21:22)  HR: 65 (05-13-19 @ 08:23) (57 - 65)  BP: 159/79 (05-13-19 @ 08:23) (116/69 - 160/74)  RR: 18 (05-13-19 @ 08:23) (18 - 18)  SpO2: 95% (05-13-19 @ 08:23) (93% - 96%)  Wt(kg): --  I&O's Summary    12 May 2019 07:01  -  13 May 2019 07:00  --------------------------------------------------------  IN: 504 mL / OUT: 0 mL / NET: 504 mL          Appearance: Normal	  HEENT:   Normal oral mucosa, PERRL, EOMI	  Lymphatic: No lymphadenopathy , no edema  Cardiovascular: Normal S1 S2, No JVD, No murmurs , Peripheral pulses palpable 2+ bilaterally  Respiratory: Lungs clear to auscultation, normal effort 	  Gastrointestinal:  Soft, Non-tender, + BS	  Skin: No rashes, No ecchymoses, No cyanosis, warm to touch  Musculoskeletal: Normal range of motion, normal strength  Psychiatry:  Mood & affect appropriate  Ext: No edema      LABS:    CARDIAC MARKERS:                                15.8   8.01  )-----------( 179      ( 13 May 2019 08:17 )             49.1     05-12    136  |  100  |  17  ----------------------------<  105<H>  3.6   |  24  |  0.84    Ca    9.7      12 May 2019 02:15      proBNP:   Lipid Profile:   HgA1c:   TSH:             TELEMETRY: 	SR    ECG:  	  RADIOLOGY:   DIAGNOSTIC TESTING:  [ ] Echocardiogram:  < from: Transthoracic Echocardiogram (05.12.19 @ 07:52) >    Patient name: ADRIEN SAWANT  YOB: 1945   Age: 73 (M)   MR#: [id number]  Study Date: 5/12/2019  Location: [address] [place], [place] [id number]: Lizzie Gray RDCS  Study quality: Technically fair  Referring Physician: James Tapia MD  Blood Pressure: 143/73 mmHg  Height: 183 cm  Weight: 95 kg  BSA: 2.2 m2  ------------------------------------------------------------------------  PROCEDURE: Transthoracic echocardiogram with 2-D, M-Mode  and complete spectral and color flow Doppler.  INDICATION: Encounter for other preprocedural examination  (Z01.818)  ------------------------------------------------------------------------  Dimensions:    Normal Values:  LA:     4.6    2.0 - 4.0 cm  Ao:     3.9    2.0 - 3.8 cm  SEPTUM: 1.1    0.6 - 1.2cm  PWT:    1.1    0.6 - 1.1 cm  LVIDd:  4.9    3.0 - 5.6 cm  LVIDs:  3.2    1.8 - 4.0 cm  Derived variables:  LVMI: 92 g/m2  RWT: 0.44  EF (Visual Estimate): 65 %  Doppler Peak Velocity (m/sec): TV=0.8  ------------------------------------------------------------------------  Observations:  Mitral Valve: Normal mitral valve. Mitral annular  calcification.  Aortic Valve/Aorta: Mildly thickened aortic valve leaflets.    Normal aortic root size. (Ao: 3.9 cm at the sinuses of  Valsalva).  Left Atrium: Normal left atrium.  LA volume index = 22  cc/m2.  Left Ventricle: Normal left ventricular systolic function.  No segmental wall motion abnormalities. Normal left  ventricular internal dimensions and wall thickness.  Impaired LV-relaxation with normal filling pressure.  Right Heart: Normal right atrium. Normal right ventricular  size and function. Normal tricuspid valve. Normal pulmonic  valve.  Pericardium/Pleura: Normal pericardium with no pericardial  effusion.  Hemodynamic: Estimated RA pressure is normal.  Pulmonary artery pressure is normal.  No PFO seen with color Doppler.  ------------------------------------------------------------------------  Conclusions:  Normal left ventricular systolic function. No segmental  wall motion abnormalities.  ------------------------------------------------------------------------  Confirmed on  5/12/2019 - 14:46:34 by Peter Bernardo M.D.  ------------------------------------------------------------------------    < end of copied text >    [ ]  Catheterization:  [ ] Stress Test:    OTHER:

## 2019-05-13 NOTE — PROGRESS NOTE ADULT - SUBJECTIVE AND OBJECTIVE BOX
Vascular Surgery Progress Note     Subjective: Patient was seen and examined during morning rounds. No speech difficulties, no new neurologic symptoms.     Objective:  PHYSICAL EXAM:   T(C): 37.1 (05-13-19 @ 04:48), Max: 37.3 (05-12-19 @ 21:22)  HR: 58 (05-13-19 @ 04:48) (57 - 62)  BP: 156/75 (05-13-19 @ 04:48) (116/69 - 160/74)  RR: 18 (05-13-19 @ 04:48) (18 - 18)  SpO2: 94% (05-13-19 @ 04:48) (93% - 96%)  Wt(kg): --    05-11 @ 07:01  -  05-12 @ 07:00  --------------------------------------------------------  IN:    heparin Infusion: 144 mL  Total IN: 144 mL    OUT:  Total OUT: 0 mL    Total NET: 144 mL      05-12 @ 07:01  -  05-13 @ 06:27  --------------------------------------------------------  IN:    heparin Infusion: 144 mL    Oral Fluid: 360 mL  Total IN: 504 mL    OUT:  Total OUT: 0 mL    Total NET: 504 mL          General: NAD  HEENT: Normocephalic, atraumatic, EOMI, PEERLA.  Cardiac: RRR  Respiratory: Breathing comfortably on RA   Abdomen: Soft, non-distended, non-tender   Groin: Normal appearing  Ext: moving all ext warm  Neuro: CN II-XII intact. Strength and sensation intact in bilateral upper and lower ext.           LABS:                                   15.7   10.1  )-----------( 212      ( 12 May 2019 02:15 )             48.4     05-12    136  |  100  |  17  ----------------------------<  105<H>  3.6   |  24  |  0.84    Ca    9.7      12 May 2019 02:15        PT/INR - ( 12 May 2019 02:15 )   PT: 12.9 sec;   INR: 1.13 ratio         PTT - ( 13 May 2019 05:43 )  PTT:84.8 sec    Imaging:  < from: MR Head No Cont (05.11.19 @ 23:55) >  IMPRESSION: Acute infarct in the high left posterior frontal cortical   region with diffusion hyperintensity and restricted diffusion on the ADC   map. No associated hemorrhage or mass effect noted at this time.   Background of microvascular ischemic changes.    < end of copied text >      < from: Transthoracic Echocardiogram (05.12.19 @ 07:52) >  Conclusions:  Normal left ventricular systolic function. No segmental  wall motion abnormalities.    < end of copied text >      < from: VA Duplex Carotid, Bilat (05.11.19 @ 11:58) >  Impression: Bulky calcified plaque at the origin of the left internal   carotid artery creating a hemodynamically significant stenosis. The   degree of luminal narrowing is estimated at 70-99% by diameter,   compatible with findings at recent MRA.    Mild calcific plaque at the origin of the right internal carotid artery   without duplex evidence of hemodynamically significant stenosis.    The measurement of carotid stenosisis based on velocity parameters that   correlate the residual internal carotid diameter with that of the more   distal vessel in accordance with a method such as the [place]   Symptomatic Carotid Endarterectomy Trial (NASCET).      < end of copied text >

## 2019-05-13 NOTE — PROGRESS NOTE ADULT - ASSESSMENT
ASSESSMENT:  74 yo M with a PMH of HTN, HLD, CAD (s/p stent 2/2014), b/l Carotid Stenosis and GERD who  presented  with complaint of episode of "word finding difficulty" lasting 1.5 minute morning of admission.  MRI demonstrated acute infarct in the left high posterior frontal cortical region.  Etiology likely symptomatic large vessel atherosclerosis in setting of severe 70-99 Left ICA stenosis.     NEURO: neurologically without acute change, continue close monitoring for neurologic deterioration, permissive HTN in setting of critical ICA stenosis but not >180mmHg SBP given Heparin gtt per vascular surgery, titrate statin to LDL goal less than 70- continue high dose statin, MR imaging as noted. Physical therapy/OT eval for home with no needs.     ANTITHROMBOTIC THERAPY: ASA, Heparin gtt with PTT goal 50-70.     PULMONARY: CXR clear, protecting airway, saturating well     CARDIOVASCULAR:  TTE: ef 65%, mitral annular calcification, mildly thickened aortic valve leaflets, cardiac monitoring without acute events noted, Cardiology consult appreciated: acceptable cardiac risk to proceed.                            SBP goal: tolerating -180mmHg.  Maintain near parameters in setting of heparin gtt and sx large artery disease.     GASTROINTESTINAL:  dysphagia screen passed, tolerating diet       Diet: regular     RENAL: BUN/Cr within range, maintain adequate hydration, good urine output      Na Goal: Greater than 135     Ivey: n    HEMATOLOGY: H/H within ragne, Platelets 179, no active bleeding noted      DVT ppx: Heparin gtt     ID: afebrile,  leukocytosis  resolved, no si/sx of infectio n    OTHER: current medical condition and plan of care d/w patient and family at bedside per request, questions and concerns addressed.  Surgical plan for 5/15. Consider enrollment in Stroke AF.     DISPOSITION: Home.      CORE MEASURES:        Admission NIHSS: 0     TPA: [] YES [x] NO      LDL/HDL: 84/41     Depression Screen: 0     Statin Therapy: y      Dysphagia Screen: [x] PASS [] FAIL     Smoking [] YES [x] NO      Afib [] YES [x] NO     Stroke Education [x] YES [] NO    Obtain screening lower extremity venous ultrasound in patients who meet 1 or more of the following criteria as patient is high risk for DVT/PE on admission:   [] History of DVT/PE  []Hypercoagulable states (Factor V Leiden, Cancer, OCP, etc. )  []Prolonged immobility (hemiplegia/hemiparesis/post operative or any other extended immobilization)  [] Transferred from outside facility (Rehab or Long term care)  [] Age </= to 50 73 years old man with multiple vascular risk factors including age, HTN, HPLD, CAD s/p coronary stents and "bilateral carotid stenoses" is evaluated at [facility] for acute onset of language disturbance in the form of expressive aphasia lasting for a few hours. MRI brain during hospitalization showed acute left MCA distribution infarct in the left frontal lobe. CTA head and neck to my eye shows severe proximal left ICA stenosis. TTE did not show any obvious structural cardiac source of embolism nor showed any evidence of a PFO on color Doppler.    Impression:  Cerebral embolism with cerebral infarction. Left MCA distribution stroke - likely mechanism being large vessel disease i.e. extracranial/proximal ICA severe stenosis leading to artery to artery embolism    NEURO: neurologically without acute change, continue close monitoring for neurologic deterioration, permissive HTN in setting of critical ICA stenosis but not >180mmHg SBP given Heparin gtt per vascular surgery, titrate statin to LDL goal less than 70- continue high dose statin, MR imaging as noted. Physical therapy/OT eval for home with no needs.     ANTITHROMBOTIC THERAPY: ASA, Heparin gtt with PTT goal 50-70.     PULMONARY: CXR clear, protecting airway, saturating well     CARDIOVASCULAR:  TTE: ef 65%, mitral annular calcification, mildly thickened aortic valve leaflets, cardiac monitoring without acute events noted, Cardiology consult appreciated: acceptable cardiac risk to proceed.                            SBP goal: tolerating -180mmHg.  Maintain near parameters in setting of heparin gtt and sx large artery disease.     GASTROINTESTINAL:  dysphagia screen passed, tolerating diet       Diet: regular     RENAL: BUN/Cr within range, maintain adequate hydration, good urine output      Na Goal: Greater than 135     Ivey: n    HEMATOLOGY: H/H within ragne, Platelets 179, no active bleeding noted      DVT ppx: Heparin gtt     ID: afebrile,  leukocytosis  resolved, no si/sx of infectio n    OTHER: current medical condition and plan of care d/w patient and family at bedside per request, questions and concerns addressed.  Surgical plan for 5/15. Consider enrollment in Stroke AF.     DISPOSITION: Home.      CORE MEASURES:        Admission NIHSS: 0     TPA: [] YES [x] NO      LDL/HDL: 84/41     Depression Screen: 0     Statin Therapy: y      Dysphagia Screen: [x] PASS [] FAIL     Smoking [] YES [x] NO      Afib [] YES [x] NO     Stroke Education [x] YES [] NO    Obtain screening lower extremity venous ultrasound in patients who meet 1 or more of the following criteria as patient is high risk for DVT/PE on admission:   [] History of DVT/PE  []Hypercoagulable states (Factor V Leiden, Cancer, OCP, etc. )  []Prolonged immobility (hemiplegia/hemiparesis/post operative or any other extended immobilization)  [] Transferred from outside facility (Rehab or Long term care)  [] Age </= to 50 73 years old man with multiple vascular risk factors including age, HTN, HPLD, CAD s/p coronary stents and "bilateral carotid stenoses" is evaluated at [facility] for acute onset of language disturbance in the form of expressive aphasia lasting for a few hours. MRI brain during hospitalization showed acute left MCA distribution infarct in the left frontal lobe. CTA head and neck to my eye shows severe proximal left ICA stenosis. TTE did not show any obvious structural cardiac source of embolism nor showed any evidence of a PFO on color Doppler.    Impression:  Cerebral embolism with cerebral infarction. Left MCA distribution stroke - likely mechanism being large vessel disease i.e. extracranial/proximal ICA severe stenosis leading to artery to artery embolism    NEURO: neurologically without acute change, continue close monitoring for neurologic deterioration, permissive HTN in setting of critical ICA stenosis but not >180 mmHg SBP given use of therapeutic anticoagulation with heparin drip as per vascular surgery, titrate statin to LDL goal less than 70 - continue high intensity statin during likely atheroembolic etiology of his stroke, MR imaging as noted. Physical therapy/OT eval for home with no needs.     ANTITHROMBOTIC THERAPY: Aspirin for secondary stroke prevention. Considering fluctuating neurological deficits on admission, agree to him continue with cautious therapeutic anticoagulation as per vascular surgery with heparin drip without bolus is in goal PTT being 50-70 for secondary stroke prevention until carotid revascularization. Plan for carotid revascularization with carotid endarterectomy on 5/15.    PULMONARY: CXR clear, protecting airway, saturating well     CARDIOVASCULAR:  TTE: LVEF 65%, mitral annular calcification, mildly thickened aortic valve leaflets, cardiac monitoring without acute events noted, Cardiology consult appreciated: acceptable cardiac risk to proceed.                            SBP goal: tolerating -180mmHg.  Maintain near parameters in setting of heparin gtt and sx large artery disease.     GASTROINTESTINAL:  dysphagia screen passed, tolerating diet       Diet: regular     RENAL: BUN/Cr within range, maintain adequate hydration, good urine output      Na Goal: Greater than 135     Ivey: n    HEMATOLOGY: H/H within range, Platelets 179, no active bleeding noted      DVT ppx: no indications of pharmacological DVT prophylaxis in the setting of therapeutic antegrade lesion use    ID: afebrile,  leukocytosis  resolved, no si/sx of infection    OTHER: current medical condition and plan of care d/w patient and family at bedside per request, questions and concerns addressed.  Surgical plan for 5/15. Consider enrollment in Stroke-AF clinical trial.     DISPOSITION: Home.    CORE MEASURES:        Admission NIHSS: 0     TPA: [] YES [x] NO      LDL/HDL: 84/41     Depression Screen: 0     Statin Therapy: y      Dysphagia Screen: [x] PASS [] FAIL     Smoking [] YES [x] NO      Afib [] YES [x] NO     Stroke Education [x] YES [] NO    Obtain screening lower extremity venous ultrasound in patients who meet 1 or more of the following criteria as patient is high risk for DVT/PE on admission:   [] History of DVT/PE  []Hypercoagulable states (Factor V Leiden, Cancer, OCP, etc. )  []Prolonged immobility (hemiplegia/hemiparesis/post operative or any other extended immobilization)  [] Transferred from outside facility (Rehab or Long term care)  [] Age </= to 50

## 2019-05-13 NOTE — PHYSICAL THERAPY INITIAL EVALUATION ADULT - PERTINENT HX OF CURRENT PROBLEM, REHAB EVAL
74 yo M with a PMH of HTN, HLD, CAD (s/p stent 2/2014), b/l Carotid Stenosis and GERD who is presenting with complaint of episode of "word finding difficulty" lasting 1.5 minutes this AM w/o recurrence. MRI head 5/11, Acute infarct in the high L posterior frontal cortical region, VA duplex b/l carotid 5/11, bulky calcified plaque at the origin of the L internal CA creating significant stenosis, mild R ICA stenosis.

## 2019-05-13 NOTE — PHYSICAL THERAPY INITIAL EVALUATION ADULT - ADDITIONAL COMMENTS
as per pt, [place], no stairs to enter, PTA, pt (I) with amb, (I) with ADls.

## 2019-05-13 NOTE — PROGRESS NOTE ADULT - SUBJECTIVE AND OBJECTIVE BOX
THE PATIENT WAS SEEN AND EXAMINED BY ME WITH THE HOUSESTAFF AND STROKE TEAM DURING MORNING ROUNDS.   HPI:  Pt is a 72 yo M with a PMH of HTN, HLD, CAD (s/p stent 2/2014), b/l Carotid Stenosis and GERD who  presented with complaint of episode of "word finding difficulty" lasting 1.5 minutes  AM of admission w/o recurrence. LKN 1000 day of admission. Pt noted a 10% decreased sensation on the right jaw line and lip corner which has been waxing and waning over the past several weeks. Otherwise pt denied prior word finding difficulties, recent fevers, chills, HA, dizziness, vision changes. NIHSS: 0. ABCD2 Score: 3. MRS: 0.      SUBJECTIVE: No events overnight.  No new neurologic complaints.      aspirin  chewable 81 milliGRAM(s) Oral daily  atorvastatin 80 milliGRAM(s) Oral at bedtime  heparin  Infusion 1100 Unit(s)/Hr IV Continuous <Continuous>  sodium chloride 0.9%. 1000 milliLiter(s) IV Continuous <Continuous>      PHYSICAL EXAM:   Vital Signs Last 24 Hrs  T(C): 37.1 (13 May 2019 11:44), Max: 37.3 (12 May 2019 21:22)  T(F): 98.8 (13 May 2019 11:44), Max: 99.1 (12 May 2019 21:22)  HR: 67 (13 May 2019 11:09) (57 - 67)  BP: 155/78 (13 May 2019 11:44) (116/69 - 159/79)  BP(mean): --  RR: 18 (13 May 2019 11:44) (18 - 18)  SpO2: 99% (13 May 2019 11:44) (93% - 99%)    General: No acute distress  HEENT: EOM intact, visual fields full  Abdomen: Soft, nontender, nondistended   Extremities: No edema    NEUROLOGICAL EXAM:  Mental status: Awake, alert, oriented x3, no aphasia, no neglect, normal memory   Cranial Nerves: No facial asymmetry, no nystagmus, no dysarthria,  tongue midline  Motor exam: Normal tone, no drift, 5/5 RUE, 5/5 RLE, 5/5 LUE, 5/5 LLE, decreased fine finger movements on right  Sensation: Intact to light touch   Coordination/ Gait: right hemiataxia     LABS:                        15.8   8.01  )-----------( 179      ( 13 May 2019 08:17 )             49.1    05-12    136  |  100  |  17  ----------------------------<  105<H>  3.6   |  24  |  0.84    Ca    9.7      12 May 2019 02:15    PT/INR - ( 12 May 2019 02:15 )   PT: 12.9 sec;   INR: 1.13 ratio         PTT - ( 13 May 2019 13:00 )  PTT:87.2 sec  Hemoglobin A1C, Whole Blood: 5.7 % (05-11 @ 10:17)      IMAGING: Reviewed by me.     MR Head No Cont (05.11.19)   Acute infarct in the high left posterior frontal cortical   region with diffusion hyperintensity and restricted diffusion on the ADC   map. No associated hemorrhage or mass effect noted at this time.   Background of microvascular ischemic changes.     CT Angio head Neck w/ IV Cont (05.10.19)   Evaluation of the brain demonstrates a high attenuation focus in the left   centrum semiovale ovale suspicious for mineralization in association with   the possible cavernoma or an old area of hemorrhage or infection.  Evidence of a 70% stenosis of the left internal carotid artery with a   calcified plaque at its origin at the level of the carotid bulb. The   right internal carotid artery origin is unremarkable     VA Duplex Carotid, Bilat (05.11.19)   Bulky calcified plaque at the origin of the left internal   carotid artery creating a hemodynamically significant stenosis. The   degree of luminal narrowing is estimated at 70-99% by diameter,   compatible with findings at recent MRA.  Mild calcific plaque at the origin of the right internal carotid artery   without duplex evidence of hemodynamically significant stenosis. THE PATIENT WAS SEEN AND EXAMINED BY ME WITH THE HOUSESTAFF AND STROKE TEAM DURING MORNING ROUNDS.     HPI:  Pt is a 74 yo M with a PMH of HTN, HLD, CAD (s/p stent 2/2014), b/l Carotid Stenosis and GERD who  presented with complaint of episode of "word finding difficulty" lasting 1.5 minutes  AM of admission w/o recurrence. LKN 1000 day of admission. Pt noted a 10% decreased sensation on the right jaw line and lip corner which has been waxing and waning over the past several weeks. Otherwise pt denied prior word finding difficulties, recent fevers, chills, HA, dizziness, vision changes. NIHSS: 0. ABCD2 Score: 3. MRS: 0.      SUBJECTIVE: No events overnight.  No new neurologic complaints.      ROS: All negative except documented above.    aspirin  chewable 81 milliGRAM(s) Oral daily  atorvastatin 80 milliGRAM(s) Oral at bedtime  heparin  Infusion 1100 Unit(s)/Hr IV Continuous <Continuous>  sodium chloride 0.9%. 1000 milliLiter(s) IV Continuous <Continuous>      PHYSICAL EXAM:   Vital Signs Last 24 Hrs  T(C): 37.1 (13 May 2019 11:44), Max: 37.3 (12 May 2019 21:22)  T(F): 98.8 (13 May 2019 11:44), Max: 99.1 (12 May 2019 21:22)  HR: 67 (13 May 2019 11:09) (57 - 67)  BP: 155/78 (13 May 2019 11:44) (116/69 - 159/79)  BP(mean): --  RR: 18 (13 May 2019 11:44) (18 - 18)  SpO2: 99% (13 May 2019 11:44) (93% - 99%)    General: No acute distress  HEENT: EOM intact, visual fields full  Abdomen: Soft, nontender, nondistended   Extremities: No edema    NEUROLOGICAL EXAM:  Mental status: Awake, alert, oriented x3, no aphasia, no neglect, normal memory   Cranial Nerves: No facial asymmetry, no nystagmus, no dysarthria,  tongue midline  Motor exam: Normal tone, no drift, 5/5 RUE, 5/5 RLE, 5/5 LUE, 5/5 LLE, decreased fine finger movements on right  Sensation: Intact to light touch   Coordination/ Gait: right hemiataxia     LABS:                        15.8   8.01  )-----------( 179      ( 13 May 2019 08:17 )             49.1    05-12    136  |  100  |  17  ----------------------------<  105<H>  3.6   |  24  |  0.84    Ca    9.7      12 May 2019 02:15    PT/INR - ( 12 May 2019 02:15 )   PT: 12.9 sec;   INR: 1.13 ratio         PTT - ( 13 May 2019 13:00 )  PTT:87.2 sec  Hemoglobin A1C, Whole Blood: 5.7 % (05-11 @ 10:17)      IMAGING: Reviewed by me.     MR Head No Cont (05.11.19)   Acute infarct in the high left posterior frontal cortical   region with diffusion hyperintensity and restricted diffusion on the ADC   map. No associated hemorrhage or mass effect noted at this time.   Background of microvascular ischemic changes.     CT Angio head Neck w/ IV Cont (05.10.19)   Evaluation of the brain demonstrates a high attenuation focus in the left   centrum semiovale ovale suspicious for mineralization in association with   the possible cavernoma or an old area of hemorrhage or infection.  Evidence of a 70% stenosis of the left internal carotid artery with a   calcified plaque at its origin at the level of the carotid bulb. The   right internal carotid artery origin is unremarkable     VA Duplex Carotid, Bilat (05.11.19)   Bulky calcified plaque at the origin of the left internal   carotid artery creating a hemodynamically significant stenosis. The   degree of luminal narrowing is estimated at 70-99% by diameter,   compatible with findings at recent MRA.  Mild calcific plaque at the origin of the right internal carotid artery   without duplex evidence of hemodynamically significant stenosis.

## 2019-05-14 LAB
APTT BLD: 63.6 SEC — HIGH (ref 27.5–36.3)
HCT VFR BLD CALC: 47.4 % — SIGNIFICANT CHANGE UP (ref 39–50)
HGB BLD-MCNC: 15.7 G/DL — SIGNIFICANT CHANGE UP (ref 13–17)
MCHC RBC-ENTMCNC: 29.2 PG — SIGNIFICANT CHANGE UP (ref 27–34)
MCHC RBC-ENTMCNC: 33.2 GM/DL — SIGNIFICANT CHANGE UP (ref 32–36)
MCV RBC AUTO: 87.9 FL — SIGNIFICANT CHANGE UP (ref 80–100)
PLATELET # BLD AUTO: 235 K/UL — SIGNIFICANT CHANGE UP (ref 150–400)
RBC # BLD: 5.39 M/UL — SIGNIFICANT CHANGE UP (ref 4.2–5.8)
RBC # FLD: 13 % — SIGNIFICANT CHANGE UP (ref 10.3–14.5)
WBC # BLD: 9 K/UL — SIGNIFICANT CHANGE UP (ref 3.8–10.5)
WBC # FLD AUTO: 9 K/UL — SIGNIFICANT CHANGE UP (ref 3.8–10.5)

## 2019-05-14 PROCEDURE — 99233 SBSQ HOSP IP/OBS HIGH 50: CPT

## 2019-05-14 RX ADMIN — Medication 81 MILLIGRAM(S): at 13:29

## 2019-05-14 RX ADMIN — ATORVASTATIN CALCIUM 80 MILLIGRAM(S): 80 TABLET, FILM COATED ORAL at 21:13

## 2019-05-14 NOTE — PROGRESS NOTE ADULT - SUBJECTIVE AND OBJECTIVE BOX
Subjective: Patient seen and examined. No new events except as noted.   resting comfortably     REVIEW OF SYSTEMS:    CONSTITUTIONAL: No weakness, fevers or chills  EYES/ENT: No visual changes;  No vertigo or throat pain   NECK: No pain or stiffness  RESPIRATORY: No cough, wheezing, hemoptysis; No shortness of breath  CARDIOVASCULAR: No chest pain or palpitations  GASTROINTESTINAL: No abdominal or epigastric pain. No nausea, vomiting, or hematemesis; No diarrhea or constipation. No melena or hematochezia.  GENITOURINARY: No dysuria, frequency or hematuria  NEUROLOGICAL: No numbness or weakness  SKIN: No itching, burning, rashes, or lesions   All other review of systems is negative unless indicated above.    MEDICATIONS:  MEDICATIONS  (STANDING):  aspirin  chewable 81 milliGRAM(s) Oral daily  atorvastatin 80 milliGRAM(s) Oral at bedtime  heparin  Infusion 1100 Unit(s)/Hr (11 mL/Hr) IV Continuous <Continuous>  sodium chloride 0.9%. 1000 milliLiter(s) (50 mL/Hr) IV Continuous <Continuous>      PHYSICAL EXAM:  T(C): 36.4 (05-14-19 @ 08:03), Max: 37.1 (05-13-19 @ 11:44)  HR: 71 (05-14-19 @ 08:03) (59 - 86)  BP: 154/70 (05-14-19 @ 08:03) (141/70 - 176/81)  RR: 18 (05-14-19 @ 08:03) (18 - 18)  SpO2: 94% (05-14-19 @ 08:03) (94% - 99%)  Wt(kg): --  I&O's Summary    13 May 2019 07:01  -  14 May 2019 07:00  --------------------------------------------------------  IN: 0 mL / OUT: 0 mL / NET: 0 mL          Appearance: NAD  HEENT:   Normal oral mucosa, PERRL, EOMI	  Lymphatic: No lymphadenopathy , no edema  Cardiovascular: Normal S1 S2, No JVD, No murmurs , Peripheral pulses palpable 2+ bilaterally  Respiratory: Lungs clear to auscultation, normal effort 	  Gastrointestinal:  Soft, Non-tender, + BS	  Skin: No rashes, No ecchymoses, No cyanosis, warm to touch  Musculoskeletal: Normal range of motion, normal strength  Psychiatry:  Mood & affect appropriate  Ext: No edema      LABS:    CARDIAC MARKERS:                                15.7   9.0   )-----------( 235      ( 14 May 2019 06:16 )             47.4           proBNP:   Lipid Profile:   HgA1c:   TSH:             TELEMETRY: 	    ECG:  	  RADIOLOGY:   DIAGNOSTIC TESTING:  [ ] Echocardiogram:  [ ]  Catheterization:  [ ] Stress Test:    OTHER:

## 2019-05-14 NOTE — CHART NOTE - NSCHARTNOTEFT_GEN_A_CORE
Plan to take patient tomorrow for CEA.    - appreciate cards recs - "Acceptable cardiac risk to proceed. Normal LVEF and METS > 4. No anginal symptoms."  - NPO after midnight, pre-op labs  - will need consent    [phone]

## 2019-05-14 NOTE — PROGRESS NOTE ADULT - SUBJECTIVE AND OBJECTIVE BOX
THE PATIENT WAS SEEN AND EXAMINED BY ME WITH THE HOUSESTAFF AND STROKE TEAM DURING MORNING ROUNDS.   HPI:  Pt is a 74 yo M with a PMH of HTN, HLD, CAD (s/p stent 2/2014), b/l Carotid Stenosis and GERD who  presented with complaint of episode of "word finding difficulty" lasting 1.5 minutes  morning of admission w/o recurrence. LKN 1000 day of admission. Pt noted a 10% decreased sensation on the right jaw line and lip corner which has been waxing and waning over the past several weeks. Otherwise pt denied prior word finding difficulties, recent fevers, chills, HA, dizziness, vision changes. NIHSS: 0. ABCD2 Score: 3. MRS: 0.      SUBJECTIVE: No events overnight.  No new neurologic complaints.    ROS: All negative except documented above.    aspirin  chewable 81 milliGRAM(s) Oral daily  atorvastatin 80 milliGRAM(s) Oral at bedtime  heparin  Infusion 1100 Unit(s)/Hr IV Continuous <Continuous>  sodium chloride 0.9%. 1000 milliLiter(s) IV Continuous <Continuous>      PHYSICAL EXAM:   Vital Signs Last 24 Hrs  T(C): 36.8 (14 May 2019 04:41), Max: 37.1 (13 May 2019 08:23)  T(F): 98.3 (14 May 2019 04:41), Max: 98.8 (13 May 2019 11:44)  HR: 59 (14 May 2019 04:41) (59 - 86)  BP: 147/69 (14 May 2019 04:41) (141/70 - 176/81)  BP(mean): --  RR: 18 (14 May 2019 04:41) (18 - 18)  SpO2: 95% (14 May 2019 04:41) (94% - 99%)    General: No acute distress  HEENT: EOM intact, visual fields full  Abdomen: Soft, nontender, nondistended   Extremities: No edema    NEUROLOGICAL EXAM:  Mental status: Awake, alert, oriented x3, no aphasia, no neglect, normal memory   Cranial Nerves: No facial asymmetry, no nystagmus, no dysarthria,  tongue midline  Motor exam: Normal tone, no drift, 5/5 RUE, 5/5 RLE, 5/5 LUE, 5/5 LLE, decreased fine finger movements on right  Sensation: Intact to light touch   Coordination/ Gait: right hemiataxia     LABS:                        15.7   9.0   )-----------( 235      ( 14 May 2019 06:16 )             47.4        PTT - ( 14 May 2019 06:16 )  PTT:63.6 sec  Hemoglobin A1C, Whole Blood: 5.7 % (05-11 @ 10:17)      IMAGING: Reviewed by me.   MR Head No Cont (05.11.19)   Acute infarct in the high left posterior frontal cortical   region with diffusion hyperintensity and restricted diffusion on the ADC   map. No associated hemorrhage or mass effect noted at this time.   Background of microvascular ischemic changes.     CT Angio head Neck w/ IV Cont (05.10.19)   Evaluation of the brain demonstrates a high attenuation focus in the left   centrum semiovale ovale suspicious for mineralization in association with   the possible cavernoma or an old area of hemorrhage or infection.  Evidence of a 70% stenosis of the left internal carotid artery with a   calcified plaque at its origin at the level of the carotid bulb. The   right internal carotid artery origin is unremarkable     VA Duplex Carotid, Bilat (05.11.19)   Bulky calcified plaque at the origin of the left internal   carotid artery creating a hemodynamically significant stenosis. The   degree of luminal narrowing is estimated at 70-99% by diameter,   compatible with findings at recent MRA.  Mild calcific plaque at the origin of the right internal carotid artery   without duplex evidence of hemodynamically significant stenosis. THE PATIENT WAS SEEN AND EXAMINED BY ME WITH THE HOUSESTAFF AND STROKE TEAM DURING MORNING ROUNDS.   HPI:  Pt is a 72 yo M with a PMH of HTN, HLD, CAD (s/p stent 2/2014), b/l Carotid Stenosis and GERD who  presented with complaint of episode of "word finding difficulty" lasting 1.5 minutes  morning of admission w/o recurrence. LKN 1000 day of admission. Pt noted a 10% decreased sensation on the right jaw line and lip corner which has been waxing and waning over the past several weeks. Otherwise pt denied prior word finding difficulties, recent fevers, chills, HA, dizziness, vision changes. NIHSS: 0. ABCD2 Score: 3. MRS: 0.      SUBJECTIVE: No events overnight.  No new neurologic complaints.    ROS: All negative except documented above.    aspirin  chewable 81 milliGRAM(s) Oral daily  atorvastatin 80 milliGRAM(s) Oral at bedtime  heparin  Infusion 1100 Unit(s)/Hr IV Continuous <Continuous>  sodium chloride 0.9%. 1000 milliLiter(s) IV Continuous <Continuous>      PHYSICAL EXAM:   Vital Signs Last 24 Hrs  T(C): 36.8 (14 May 2019 04:41), Max: 37.1 (13 May 2019 08:23)  T(F): 98.3 (14 May 2019 04:41), Max: 98.8 (13 May 2019 11:44)  HR: 59 (14 May 2019 04:41) (59 - 86)  BP: 147/69 (14 May 2019 04:41) (141/70 - 176/81)  BP(mean): --  RR: 18 (14 May 2019 04:41) (18 - 18)  SpO2: 95% (14 May 2019 04:41) (94% - 99%)    General: No acute distress  HEENT: EOM intact, visual fields full  Abdomen: Soft, nontender, nondistended   Extremities: No edema    NEUROLOGICAL EXAM:  Mental status: Awake, alert, oriented x3, no aphasia, no neglect, normal memory   Cranial Nerves: No facial asymmetry, no nystagmus, no dysarthria,  tongue midline  Motor exam: Normal tone, no drift, 5/5 RUE, 5/5 RLE, 5/5 LUE, 5/5 LLE, trace decreased fine finger movements on right  Sensation: Intact to light touch   Coordination/ Gait: no hemiataxia     LABS:                        15.7   9.0   )-----------( 235      ( 14 May 2019 06:16 )             47.4        PTT - ( 14 May 2019 06:16 )  PTT:63.6 sec  Hemoglobin A1C, Whole Blood: 5.7 % (05-11 @ 10:17)      IMAGING: Reviewed by me.   MR Head No Cont (05.11.19)   Acute infarct in the high left posterior frontal cortical   region with diffusion hyperintensity and restricted diffusion on the ADC   map. No associated hemorrhage or mass effect noted at this time.   Background of microvascular ischemic changes.     CT Angio head Neck w/ IV Cont (05.10.19)   Evaluation of the brain demonstrates a high attenuation focus in the left   centrum semiovale ovale suspicious for mineralization in association with   the possible cavernoma or an old area of hemorrhage or infection.  Evidence of a 70% stenosis of the left internal carotid artery with a   calcified plaque at its origin at the level of the carotid bulb. The   right internal carotid artery origin is unremarkable     VA Duplex Carotid, Bilat (05.11.19)   Bulky calcified plaque at the origin of the left internal   carotid artery creating a hemodynamically significant stenosis. The   degree of luminal narrowing is estimated at 70-99% by diameter,   compatible with findings at recent MRA.  Mild calcific plaque at the origin of the right internal carotid artery   without duplex evidence of hemodynamically significant stenosis. THE PATIENT WAS SEEN AND EXAMINED BY ME WITH THE HOUSESTAFF AND STROKE TEAM DURING MORNING ROUNDS.     HPI:  Pt is a 72 yo M with a PMH of HTN, HLD, CAD (s/p stent 2/2014), b/l Carotid Stenosis and GERD who  presented with complaint of episode of "word finding difficulty" lasting 1.5 minutes  morning of admission w/o recurrence. LKN 1000 day of admission. Pt noted a 10% decreased sensation on the right jaw line and lip corner which has been waxing and waning over the past several weeks. Otherwise pt denied prior word finding difficulties, recent fevers, chills, HA, dizziness, vision changes. NIHSS: 0. ABCD2 Score: 3. MRS: 0.      SUBJECTIVE: No events overnight.  No new neurologic complaints.      ROS: All negative except documented above.    aspirin  chewable 81 milliGRAM(s) Oral daily  atorvastatin 80 milliGRAM(s) Oral at bedtime  heparin  Infusion 1100 Unit(s)/Hr IV Continuous <Continuous>  sodium chloride 0.9%. 1000 milliLiter(s) IV Continuous <Continuous>      PHYSICAL EXAM:   Vital Signs Last 24 Hrs  T(C): 36.8 (14 May 2019 04:41), Max: 37.1 (13 May 2019 08:23)  T(F): 98.3 (14 May 2019 04:41), Max: 98.8 (13 May 2019 11:44)  HR: 59 (14 May 2019 04:41) (59 - 86)  BP: 147/69 (14 May 2019 04:41) (141/70 - 176/81)  BP(mean): --  RR: 18 (14 May 2019 04:41) (18 - 18)  SpO2: 95% (14 May 2019 04:41) (94% - 99%)    General: No acute distress  HEENT: EOM intact, visual fields full  Abdomen: Soft, nontender, nondistended   Extremities: No edema    NEUROLOGICAL EXAM:  Mental status: Awake, alert, oriented x3, no aphasia, no neglect, normal memory   Cranial Nerves: No facial asymmetry, no nystagmus, no dysarthria,  tongue midline  Motor exam: Normal tone, no drift, 5/5 RUE, 5/5 RLE, 5/5 LUE, 5/5 LLE, trace decreased fine finger movements on right  Sensation: Intact to light touch   Coordination/ Gait: no hemiataxia     LABS:                        15.7   9.0   )-----------( 235      ( 14 May 2019 06:16 )             47.4        PTT - ( 14 May 2019 06:16 )  PTT:63.6 sec  Hemoglobin A1C, Whole Blood: 5.7 % (05-11 @ 10:17)      IMAGING: Reviewed by me.   MR Head No Cont (05.11.19)   Acute infarct in the high left posterior frontal cortical   region with diffusion hyperintensity and restricted diffusion on the ADC   map. No associated hemorrhage or mass effect noted at this time.   Background of microvascular ischemic changes.     CT Angio head Neck w/ IV Cont (05.10.19)   Evaluation of the brain demonstrates a high attenuation focus in the left   centrum semiovale ovale suspicious for mineralization in association with   the possible cavernoma or an old area of hemorrhage or infection.  Evidence of a 70% stenosis of the left internal carotid artery with a   calcified plaque at its origin at the level of the carotid bulb. The   right internal carotid artery origin is unremarkable     VA Duplex Carotid, Bilat (05.11.19)   Bulky calcified plaque at the origin of the left internal   carotid artery creating a hemodynamically significant stenosis. The   degree of luminal narrowing is estimated at 70-99% by diameter,   compatible with findings at recent MRA.  Mild calcific plaque at the origin of the right internal carotid artery   without duplex evidence of hemodynamically significant stenosis.

## 2019-05-14 NOTE — PROGRESS NOTE ADULT - ASSESSMENT
73 years old man with multiple vascular risk factors including age, HTN, HPLD, CAD s/p coronary stents and "bilateral carotid stenoses" is evaluated at [facility] for acute onset of language disturbance in the form of expressive aphasia lasting for a few hours. MRI brain during hospitalization showed acute left MCA distribution infarct in the left frontal lobe. CTA head and neck to my eye shows severe proximal left ICA stenosis. TTE did not show any obvious structural cardiac source of embolism nor showed any evidence of a PFO on color Doppler.    Impression:  Cerebral embolism with cerebral infarction. Left MCA distribution stroke - likely mechanism being large vessel disease i.e. extracranial/proximal ICA severe stenosis leading to artery to artery embolism    NEURO: neurologically without acute change, continue close monitoring for neurologic deterioration, permissive HTN in setting of critical ICA stenosis but not >180 mmHg SBP given use of therapeutic anticoagulation with heparin drip as per vascular surgery, titrate statin to LDL goal less than 70 - continue high intensity statin during likely atheroembolic etiology of his stroke, MR imaging as noted. Physical therapy/OT eval for home with no needs.     ANTITHROMBOTIC THERAPY: Aspirin for secondary stroke prevention. Considering fluctuating neurological deficits on admission, agree to him continue with cautious therapeutic anticoagulation as per vascular surgery with heparin drip without bolus is in goal PTT being 50-70 for secondary stroke prevention until carotid revascularization. Plan for carotid revascularization with carotid endarterectomy on 5/15.    PULMONARY: CXR clear, protecting airway, saturating well     CARDIOVASCULAR:  TTE: LVEF 65%, mitral annular calcification, mildly thickened aortic valve leaflets, cardiac monitoring without acute events noted, Cardiology consult appreciated: acceptable cardiac risk to proceed.                            SBP goal: tolerating -180mmHg.  Maintain near parameters in setting of heparin gtt and sx large artery disease.     GASTROINTESTINAL:  dysphagia screen passed, tolerating diet       Diet: regular - NPO after midnight     RENAL: BUN/Cr within range, maintain adequate hydration, good urine output      Na Goal: Greater than 135     Ivey: n    HEMATOLOGY: H/H within range, Platelets 235, no active bleeding noted      DVT ppx: no indications of pharmacological DVT prophylaxis in the setting of therapeutic  anticoagulation use    ID: afebrile,  leukocytosis  resolved, no si/sx of infection    OTHER: current medical condition and plan of care d/w patient and family at bedside per request, questions and concerns addressed.  Surgical plan for 5/15. Consider enrollment in Stroke-AF clinical trial.     DISPOSITION: Home.    CORE MEASURES:        Admission NIHSS: 0     TPA: [] YES [x] NO      LDL/HDL: 84/41     Depression Screen: 0     Statin Therapy: y      Dysphagia Screen: [x] PASS [] FAIL     Smoking [] YES [x] NO      Afib [] YES [x] NO     Stroke Education [x] YES [] NO    Obtain screening lower extremity venous ultrasound in patients who meet 1 or more of the following criteria as patient is high risk for DVT/PE on admission:   [] History of DVT/PE  []Hypercoagulable states (Factor V Leiden, Cancer, OCP, etc. )  []Prolonged immobility (hemiplegia/hemiparesis/post operative or any other extended immobilization)  [] Transferred from outside facility (Rehab or Long term care)  [] Age </= to 50 73 years old man with multiple vascular risk factors including age, HTN, HPLD, CAD s/p coronary stents and "bilateral carotid stenoses" is evaluated at [facility] for acute onset of language disturbance in the form of expressive aphasia lasting for a few hours. MRI brain during hospitalization showed acute left MCA distribution infarct in the left frontal lobe. CTA head and neck to my eye shows severe proximal left ICA stenosis. TTE did not show any obvious structural cardiac source of embolism nor showed any evidence of a PFO on color Doppler.    Impression:  Cerebral embolism with cerebral infarction. Left MCA distribution stroke - likely mechanism being large vessel disease i.e. extracranial/proximal ICA severe stenosis leading to artery to artery embolism    NEURO: neurologically without acute change, continue close monitoring for neurologic deterioration, permissive HTN in setting of critical ICA stenosis but not >180 mmHg SBP and goal not less then 120mmHg given use of therapeutic anticoagulation with heparin drip as per vascular surgery, titrate statin to LDL goal less than 70 - continue high intensity statin during likely atheroembolic etiology of his stroke, MR imaging as noted. Physical therapy/OT eval for home with no needs.     ANTITHROMBOTIC THERAPY: Aspirin for secondary stroke prevention. Considering fluctuating neurological deficits on admission, agree to him continue with cautious therapeutic anticoagulation as per vascular surgery with heparin drip without bolus is in goal PTT being 50-70 for secondary stroke prevention until carotid revascularization. Plan for carotid revascularization with carotid endarterectomy on 5/15.    PULMONARY: CXR clear, protecting airway, saturating well     CARDIOVASCULAR:  TTE: LVEF 65%, mitral annular calcification, mildly thickened aortic valve leaflets, cardiac monitoring without acute events noted, Cardiology consult appreciated: acceptable cardiac risk to proceed.                            SBP goal: tolerating -180mmHg.  Maintain near parameters in setting of heparin gtt and sx large artery disease.     GASTROINTESTINAL:  dysphagia screen passed, tolerating diet       Diet: regular - NPO after midnight     RENAL: BUN/Cr within range, maintain adequate hydration, good urine output      Na Goal: Greater than 135     Ivey: n    HEMATOLOGY: H/H within range, Platelets 235, no active bleeding noted      DVT ppx: no indications of pharmacological DVT prophylaxis in the setting of therapeutic  anticoagulation use    ID: afebrile,  leukocytosis  resolved, no si/sx of infection    OTHER: current medical condition and plan of care d/w patient and family at bedside per request, questions and concerns addressed.  Surgical plan for 5/15. Consider and will d/w patient possible enrollment in Stroke-AF clinical trial.     DISPOSITION: Home.    CORE MEASURES:        Admission NIHSS: 0     TPA: [] YES [x] NO      LDL/HDL: 84/41     Depression Screen: 0     Statin Therapy: y      Dysphagia Screen: [x] PASS [] FAIL     Smoking [] YES [x] NO      Afib [] YES [x] NO     Stroke Education [x] YES [] NO    Obtain screening lower extremity venous ultrasound in patients who meet 1 or more of the following criteria as patient is high risk for DVT/PE on admission:   [] History of DVT/PE  []Hypercoagulable states (Factor V Leiden, Cancer, OCP, etc. )  []Prolonged immobility (hemiplegia/hemiparesis/post operative or any other extended immobilization)  [] Transferred from outside facility (Rehab or Long term care)  [] Age </= to 50 73 years old man with multiple vascular risk factors including age, HTN, HPLD, CAD s/p coronary stents and "bilateral carotid stenoses" is evaluated at [facility] for acute onset of language disturbance in the form of expressive aphasia lasting for a few hours. MRI brain during hospitalization showed acute left MCA distribution infarct in the left frontal lobe. CTA head and neck to my eye shows severe proximal left ICA stenosis. TTE did not show any obvious structural cardiac source of embolism nor showed any evidence of a PFO on color Doppler. CUS confirmed hemodynamically significant severe L ICA stenosis (70-99%).     Impression:  Cerebral embolism with cerebral infarction. Left MCA distribution stroke - likely mechanism being large vessel disease i.e. extracranial/proximal ICA severe stenosis leading to artery to artery embolism    NEURO: neurologically without acute change, continue close monitoring for neurologic deterioration, permissive HTN in setting of critical ICA stenosis but not >180 mmHg SBP and goal not less then 120 mmHg given use of therapeutic anticoagulation with heparin drip as per vascular surgery, titrate statin to LDL goal less than 70 - continue high intensity statin during likely atheroembolic etiology of his stroke, MR imaging as noted. Physical therapy/OT eval for home with no needs.     ANTITHROMBOTIC THERAPY: Aspirin for secondary stroke prevention. Considering fluctuating neurological deficits on admission, agree to him continue with cautious therapeutic anticoagulation as per vascular surgery with heparin drip without bolus is in goal PTT being 50-70 for secondary stroke prevention until carotid revascularization. Plan for carotid revascularization with carotid endarterectomy on 5/15.    PULMONARY: CXR clear, protecting airway, saturating well     CARDIOVASCULAR:  TTE: LVEF 65%, mitral annular calcification, mildly thickened aortic valve leaflets, cardiac monitoring without acute events noted, Cardiology consult appreciated: acceptable cardiac risk to proceed.                            SBP goal: tolerating -180mmHg.  Maintain near parameters in setting of heparin gtt and sx large artery disease.     GASTROINTESTINAL:  dysphagia screen passed, tolerating diet       Diet: regular - NPO after midnight     RENAL: BUN/Cr within range, maintain adequate hydration, good urine output      Na Goal: Greater than 135     Ivey: n    HEMATOLOGY: H/H within range, Platelets 235, no active bleeding noted      DVT ppx: no indications of pharmacological DVT prophylaxis in the setting of therapeutic  anticoagulation use    ID: afebrile,  leukocytosis  resolved, no si/sx of infection    OTHER: current medical condition and plan of care d/w patient and family at bedside per request, questions and concerns addressed.  Surgical plan for 5/15. Consider and will d/w patient possible enrollment in Stroke-AF clinical trial.     DISPOSITION: Home.    CORE MEASURES:        Admission NIHSS: 0     TPA: [] YES [x] NO      LDL/HDL: 84/41     Depression Screen: 0     Statin Therapy: y      Dysphagia Screen: [x] PASS [] FAIL     Smoking [] YES [x] NO      Afib [] YES [x] NO     Stroke Education [x] YES [] NO    Obtain screening lower extremity venous ultrasound in patients who meet 1 or more of the following criteria as patient is high risk for DVT/PE on admission:   [] History of DVT/PE  []Hypercoagulable states (Factor V Leiden, Cancer, OCP, etc. )  []Prolonged immobility (hemiplegia/hemiparesis/post operative or any other extended immobilization)  [] Transferred from outside facility (Rehab or Long term care)  [] Age </= to 50

## 2019-05-15 ENCOUNTER — RESULT REVIEW (OUTPATIENT)
Age: 74
End: 2019-05-15

## 2019-05-15 ENCOUNTER — APPOINTMENT (OUTPATIENT)
Dept: VASCULAR SURGERY | Facility: HOSPITAL | Age: 74
End: 2019-05-15
Payer: COMMERCIAL

## 2019-05-15 ENCOUNTER — TRANSCRIPTION ENCOUNTER (OUTPATIENT)
Age: 74
End: 2019-05-15

## 2019-05-15 LAB
ANION GAP SERPL CALC-SCNC: 12 MMOL/L — SIGNIFICANT CHANGE UP (ref 5–17)
APTT BLD: 44.9 SEC — HIGH (ref 27.5–36.3)
BLD GP AB SCN SERPL QL: NEGATIVE — SIGNIFICANT CHANGE UP
BUN SERPL-MCNC: 16 MG/DL — SIGNIFICANT CHANGE UP (ref 7–23)
CALCIUM SERPL-MCNC: 10 MG/DL — SIGNIFICANT CHANGE UP (ref 8.4–10.5)
CHLORIDE SERPL-SCNC: 100 MMOL/L — SIGNIFICANT CHANGE UP (ref 96–108)
CO2 SERPL-SCNC: 27 MMOL/L — SIGNIFICANT CHANGE UP (ref 22–31)
CREAT SERPL-MCNC: 0.91 MG/DL — SIGNIFICANT CHANGE UP (ref 0.5–1.3)
GLUCOSE SERPL-MCNC: 93 MG/DL — SIGNIFICANT CHANGE UP (ref 70–99)
HCT VFR BLD CALC: 53.2 % — HIGH (ref 39–50)
HGB BLD-MCNC: 16.7 G/DL — SIGNIFICANT CHANGE UP (ref 13–17)
INR BLD: 1.02 RATIO — SIGNIFICANT CHANGE UP (ref 0.88–1.16)
MCHC RBC-ENTMCNC: 28 PG — SIGNIFICANT CHANGE UP (ref 27–34)
MCHC RBC-ENTMCNC: 31.4 GM/DL — LOW (ref 32–36)
MCV RBC AUTO: 89.3 FL — SIGNIFICANT CHANGE UP (ref 80–100)
PLATELET # BLD AUTO: 208 K/UL — SIGNIFICANT CHANGE UP (ref 150–400)
POTASSIUM SERPL-MCNC: 4.3 MMOL/L — SIGNIFICANT CHANGE UP (ref 3.5–5.3)
POTASSIUM SERPL-SCNC: 4.3 MMOL/L — SIGNIFICANT CHANGE UP (ref 3.5–5.3)
PROTHROM AB SERPL-ACNC: 11.7 SEC — SIGNIFICANT CHANGE UP (ref 10–13.1)
RBC # BLD: 5.96 M/UL — HIGH (ref 4.2–5.8)
RBC # FLD: 14.6 % — HIGH (ref 10.3–14.5)
RH IG SCN BLD-IMP: POSITIVE — SIGNIFICANT CHANGE UP
SODIUM SERPL-SCNC: 139 MMOL/L — SIGNIFICANT CHANGE UP (ref 135–145)
WBC # BLD: 8.93 K/UL — SIGNIFICANT CHANGE UP (ref 3.8–10.5)
WBC # FLD AUTO: 8.93 K/UL — SIGNIFICANT CHANGE UP (ref 3.8–10.5)

## 2019-05-15 PROCEDURE — 88311 DECALCIFY TISSUE: CPT | Mod: 26

## 2019-05-15 PROCEDURE — 99233 SBSQ HOSP IP/OBS HIGH 50: CPT

## 2019-05-15 PROCEDURE — 35301 RECHANNELING OF ARTERY: CPT | Mod: LT

## 2019-05-15 PROCEDURE — 88304 TISSUE EXAM BY PATHOLOGIST: CPT | Mod: 26

## 2019-05-15 RX ORDER — ACETAMINOPHEN 500 MG
1000 TABLET ORAL ONCE
Refills: 0 | Status: COMPLETED | OUTPATIENT
Start: 2019-05-15 | End: 2019-05-15

## 2019-05-15 RX ORDER — PHENYLEPHRINE HYDROCHLORIDE 10 MG/ML
0.5 INJECTION INTRAVENOUS
Qty: 40 | Refills: 0 | Status: DISCONTINUED | OUTPATIENT
Start: 2019-05-15 | End: 2019-05-15

## 2019-05-15 RX ORDER — ACETAMINOPHEN 500 MG
650 TABLET ORAL EVERY 6 HOURS
Refills: 0 | Status: DISCONTINUED | OUTPATIENT
Start: 2019-05-15 | End: 2019-05-16

## 2019-05-15 RX ORDER — ATORVASTATIN CALCIUM 80 MG/1
40 TABLET, FILM COATED ORAL AT BEDTIME
Refills: 0 | Status: DISCONTINUED | OUTPATIENT
Start: 2019-05-15 | End: 2019-05-16

## 2019-05-15 RX ORDER — ONDANSETRON 8 MG/1
4 TABLET, FILM COATED ORAL ONCE
Refills: 0 | Status: DISCONTINUED | OUTPATIENT
Start: 2019-05-15 | End: 2019-05-15

## 2019-05-15 RX ORDER — ASPIRIN/CALCIUM CARB/MAGNESIUM 324 MG
81 TABLET ORAL DAILY
Refills: 0 | Status: DISCONTINUED | OUTPATIENT
Start: 2019-05-15 | End: 2019-05-16

## 2019-05-15 RX ORDER — HYDROMORPHONE HYDROCHLORIDE 2 MG/ML
0.5 INJECTION INTRAMUSCULAR; INTRAVENOUS; SUBCUTANEOUS
Refills: 0 | Status: DISCONTINUED | OUTPATIENT
Start: 2019-05-15 | End: 2019-05-15

## 2019-05-15 RX ORDER — DEXTROSE MONOHYDRATE, SODIUM CHLORIDE, AND POTASSIUM CHLORIDE 50; .745; 4.5 G/1000ML; G/1000ML; G/1000ML
1000 INJECTION, SOLUTION INTRAVENOUS
Refills: 0 | Status: DISCONTINUED | OUTPATIENT
Start: 2019-05-15 | End: 2019-05-16

## 2019-05-15 RX ADMIN — Medication 81 MILLIGRAM(S): at 15:31

## 2019-05-15 RX ADMIN — Medication 1000 MILLIGRAM(S): at 15:30

## 2019-05-15 RX ADMIN — Medication 650 MILLIGRAM(S): at 21:51

## 2019-05-15 RX ADMIN — PHENYLEPHRINE HYDROCHLORIDE 17.85 MICROGRAM(S)/KG/MIN: 10 INJECTION INTRAVENOUS at 16:27

## 2019-05-15 RX ADMIN — ATORVASTATIN CALCIUM 40 MILLIGRAM(S): 80 TABLET, FILM COATED ORAL at 21:49

## 2019-05-15 RX ADMIN — Medication 400 MILLIGRAM(S): at 15:16

## 2019-05-15 RX ADMIN — DEXTROSE MONOHYDRATE, SODIUM CHLORIDE, AND POTASSIUM CHLORIDE 75 MILLILITER(S): 50; .745; 4.5 INJECTION, SOLUTION INTRAVENOUS at 15:39

## 2019-05-15 RX ADMIN — Medication 650 MILLIGRAM(S): at 22:00

## 2019-05-15 NOTE — CHART NOTE - NSCHARTNOTEFT_GEN_A_CORE
Post-operative Check    SUBJECTIVE: No acute events in the immediate post-operative period. Pain well controlled. Off pressors. BP stable. HR WNL.     OBJECTIVE:  T(C): 36 (05-15-19 @ 14:00), Max: 37.6 (05-15-19 @ 05:44)  HR: 62 (05-15-19 @ 20:15) (48 - 85)  BP: 111/53 (05-15-19 @ 20:00) (102/51 - 210/78)  RR: 16 (05-15-19 @ 18:45) (14 - 19)  SpO2: 95% (05-15-19 @ 20:15) (93% - 98%)      05-14-19 @ 07:01  -  05-15-19 @ 07:00  --------------------------------------------------------  IN: 840 mL / OUT: 0 mL / NET: 840 mL    05-15-19 @ 07:01  -  05-15-19 @ 21:07  --------------------------------------------------------  IN: 771.4 mL / OUT: 400 mL / NET: 371.4 mL        Physical Exam:   General: well developed, well nourished, NAD  Neuro: alert and oriented, no focal deficits, moves all extremities spontaneously  HEENT: L neck dressing with SS drainage, EOMI, anicteric, mucosa moist  Respiratory: airway patent, respirations unlabored  CVS: regular rate and rhythm  Abdomen: soft, nontender, nondistended  Extremities: no edema, sensation and movement grossly intact  Skin: warm, dry, appropriate color    ASSESSMENT:   ADRIEN SAWANT is a 73y Male POD#0 from Left carotid endarterectomy    PLAN:  - heparin stopped  - restart aspirin in the AM  - monitor vitals  - f/u AM labs  - OOB as tolerated  - monitor UOP - vargas in place  - appropriate for transfer to the floor

## 2019-05-15 NOTE — PROGRESS NOTE ADULT - SUBJECTIVE AND OBJECTIVE BOX
Subjective: Patient seen and examined. No new events except as noted.   feels ok   scheduled for second case today       REVIEW OF SYSTEMS:    CONSTITUTIONAL: N+weakness, fevers or chills  EYES/ENT: No visual changes;  No vertigo or throat pain   NECK: No pain or stiffness  RESPIRATORY: No cough, wheezing, hemoptysis; No shortness of breath  CARDIOVASCULAR: No chest pain or palpitations  GASTROINTESTINAL: No abdominal or epigastric pain. No nausea, vomiting, or hematemesis; No diarrhea or constipation. No melena or hematochezia.  GENITOURINARY: No dysuria, frequency or hematuria  NEUROLOGICAL: No numbness or weakness  SKIN: No itching, burning, rashes, or lesions   All other review of systems is negative unless indicated above.    MEDICATIONS:  MEDICATIONS  (STANDING):  aspirin  chewable 81 milliGRAM(s) Oral daily  atorvastatin 80 milliGRAM(s) Oral at bedtime  heparin  Infusion 1100 Unit(s)/Hr (11 mL/Hr) IV Continuous <Continuous>  sodium chloride 0.9%. 1000 milliLiter(s) (50 mL/Hr) IV Continuous <Continuous>      PHYSICAL EXAM:  T(C): 37.6 (05-15-19 @ 05:44), Max: 37.6 (05-15-19 @ 05:44)  HR: 62 (05-15-19 @ 05:44) (60 - 67)  BP: 160/75 (05-15-19 @ 05:44) (151/77 - 165/72)  RR: 18 (05-15-19 @ 05:44) (17 - 18)  SpO2: 98% (05-15-19 @ 05:44) (94% - 98%)  Wt(kg): --  I&O's Summary    14 May 2019 07:01  -  15 May 2019 07:00  --------------------------------------------------------  IN: 840 mL / OUT: 0 mL / NET: 840 mL          Appearance: Normal	  HEENT:   Normal oral mucosa, PERRL, EOMI	  Lymphatic: No lymphadenopathy , no edema  Cardiovascular: Normal S1 S2, No JVD, No murmurs , Peripheral pulses palpable 2+ bilaterally  Respiratory: Lungs clear to auscultation, normal effort 	  Gastrointestinal:  Soft, Non-tender, + BS	  Skin: No rashes, No ecchymoses, No cyanosis, warm to touch  Musculoskeletal: Normal range of motion, normal strength  Psychiatry:  Mood & affect appropriate  Ext: No edema      LABS:    CARDIAC MARKERS:                                15.7   9.0   )-----------( 235      ( 14 May 2019 06:16 )             47.4     05-15    139  |  100  |  16  ----------------------------<  93  4.3   |  27  |  0.91    Ca    10.0      15 May 2019 06:50      proBNP:   Lipid Profile:   HgA1c:   TSH:             TELEMETRY: 	SR    ECG:  	  RADIOLOGY:   DIAGNOSTIC TESTING:  [ ] Echocardiogram:  [ ]  Catheterization:  [ ] Stress Test:    OTHER:

## 2019-05-15 NOTE — PROGRESS NOTE ADULT - ASSESSMENT
ASSESSMENT: Patient is a 73M w/ PMHx as noted with months of intermittent right sided facial numbness and today brief moment of "word finding difficulty"  now resolved with CTA of neck revealing 70% occlusion of L ICA with concurrent findings on duplex.     PLAN:    - CEA today with Dr. Dozier  - NPO, pre-op labs received by lab, consent in chart  - heparin gtt held at 6am      Vascular Sx [id number]

## 2019-05-15 NOTE — PROGRESS NOTE ADULT - SUBJECTIVE AND OBJECTIVE BOX
THE PATIENT WAS SEEN AND EXAMINED BY ME WITH THE HOUSESTAFF AND STROKE TEAM DURING MORNING ROUNDS.     HPI:  Pt is a 72 yo M with a PMH of HTN, HLD, CAD (s/p stent 2/2014), b/l Carotid Stenosis and GERD who is presenting with complaint of episode of "word finding difficulty" lasting 1.5 minutes this AM w/o recurrence. LKN 1000. Pt notes 10% decreased sensation on the right jaw line and lip corner which has been waxing and waning over the past several weeks. Otherwise pt denies prior word finding difficulties, recent fevers, chills, HA, dizziness, vision changes. NIHSS: 0. ABCD2 Score: 3. MRS: 0. (11 May 2019 13:03)    SUBJECTIVE: S/P CEA; tolerated procedure well. Denies any focal neurological symptoms.     ROS: All negative except documented above.    acetaminophen   Tablet .. 650 milliGRAM(s) Oral every 6 hours PRN  aspirin enteric coated 81 milliGRAM(s) Oral daily  atorvastatin 40 milliGRAM(s) Oral at bedtime  dextrose 5% + sodium chloride 0.45% with potassium chloride 20 mEq/L 1000 milliLiter(s) IV Continuous <Continuous>      PHYSICAL EXAM:   Vital Signs Last 24 Hrs  T(C): 36.9 (15 May 2019 22:46), Max: 37.6 (15 May 2019 05:44)  T(F): 98.4 (15 May 2019 22:46), Max: 99.7 (15 May 2019 05:44)  HR: 63 (15 May 2019 22:46) (48 - 85)  BP: 113/54 (15 May 2019 22:46) (102/51 - 210/78)  BP(mean): 85 (15 May 2019 22:00) (72 - 105)  RR: 17 (15 May 2019 22:46) (14 - 19)  SpO2: 93% (15 May 2019 22:46) (93% - 98%)    General: No acute distress, dressing over the left neck   HEENT: EOM intact, visual fields full  Abdomen: Soft, nontender, nondistended   Extremities: No edema    NEUROLOGICAL EXAM:  Mental status: Awake, alert, oriented x3, no aphasia, no neglect  Cranial Nerves: No facial asymmetry, no nystagmus, no dysarthria,  tongue midline  Motor exam: Normal tone, no drift, 5/5 RUE, 5/5 RLE, 5/5 LUE, 5/5 LLE, trace decreased fine finger movements on right  Sensation: Intact to light touch   Coordination/ Gait: no ataxia/dysmetria     LABS:                        16.7   8.93  )-----------( 208      ( 15 May 2019 10:20 )             53.2    05-15    139  |  100  |  16  ----------------------------<  93  4.3   |  27  |  0.91    Ca    10.0      15 May 2019 06:50    PT/INR - ( 15 May 2019 09:31 )   PT: 11.7 sec;   INR: 1.02 ratio         PTT - ( 15 May 2019 09:31 )  PTT:44.9 sec  Hemoglobin A1C, Whole Blood: 5.7 % (05-11 @ 10:17)      IMAGING: Reviewed by me.     MR Head No Cont (05.11.19)   Acute infarct in the high left posterior frontal cortical   region with diffusion hyperintensity and restricted diffusion on the ADC   map. No associated hemorrhage or mass effect noted at this time.   Background of microvascular ischemic changes.     CT Angio head Neck w/ IV Cont (05.10.19)   Evaluation of the brain demonstrates a high attenuation focus in the left   centrum semiovale ovale suspicious for mineralization in association with   the possible cavernoma or an old area of hemorrhage or infection.  Evidence of a 70% stenosis of the left internal carotid artery with a   calcified plaque at its origin at the level of the carotid bulb. The   right internal carotid artery origin is unremarkable     VA Duplex Carotid, Bilat (05.11.19)   Bulky calcified plaque at the origin of the left internal   carotid artery creating a hemodynamically significant stenosis. The   degree of luminal narrowing is estimated at 70-99% by diameter,   compatible with findings at recent MRA.  Mild calcific plaque at the origin of the right internal carotid artery   without duplex evidence of hemodynamically significant stenosis.

## 2019-05-15 NOTE — PROGRESS NOTE ADULT - SUBJECTIVE AND OBJECTIVE BOX
Vascular Surgery Progress Note     Subjective: Patient was seen and examined during morning rounds. No speech difficulties, no new neurologic symptoms. NPO for OR today.  Hep gtt held.    Objective:  PHYSICAL EXAM:   T(C): 37.6 (05-15-19 @ 05:44), Max: 37.6 (05-15-19 @ 05:44)  HR: 62 (05-15-19 @ 05:44) (60 - 71)  BP: 160/75 (05-15-19 @ 05:44) (151/77 - 165/72)  RR: 18 (05-15-19 @ 05:44) (17 - 18)  SpO2: 98% (05-15-19 @ 05:44) (94% - 98%)  Wt(kg): --    05-13 @ 07:01  -  05-14 @ 07:00  --------------------------------------------------------  IN:  Total IN: 0 mL    OUT:  Total OUT: 0 mL    Total NET: 0 mL      05-14 @ 07:01  -  05-15 @ 06:51  --------------------------------------------------------  IN:    Oral Fluid: 240 mL  Total IN: 240 mL    OUT:  Total OUT: 0 mL    Total NET: 240 mL            General: NAD  HEENT: Normocephalic, atraumatic, EOMI, PEERLA.  Cardiac: RRR  Respiratory: Breathing comfortably on RA   Abdomen: Soft, non-distended, non-tender   Groin: Normal appearing  Ext: moving all ext warm  Neuro: CN II-XII intact. Strength and sensation intact in bilateral upper and lower ext.           LABS:                        15.7   9.0   )-----------( 235      ( 14 May 2019 06:16 )             47.4             PTT - ( 14 May 2019 06:16 )  PTT:63.6 sec           Imaging:  < from: MR Head No Cont (05.11.19 @ 23:55) >  IMPRESSION: Acute infarct in the high left posterior frontal cortical   region with diffusion hyperintensity and restricted diffusion on the ADC   map. No associated hemorrhage or mass effect noted at this time.   Background of microvascular ischemic changes.    < end of copied text >      < from: Transthoracic Echocardiogram (05.12.19 @ 07:52) >  Conclusions:  Normal left ventricular systolic function. No segmental  wall motion abnormalities.    < end of copied text >      < from: VA Duplex Carotid, Bilat (05.11.19 @ 11:58) >  Impression: Bulky calcified plaque at the origin of the left internal   carotid artery creating a hemodynamically significant stenosis. The   degree of luminal narrowing is estimated at 70-99% by diameter,   compatible with findings at recent MRA.    Mild calcific plaque at the origin of the right internal carotid artery   without duplex evidence of hemodynamically significant stenosis.    The measurement of carotid stenosisis based on velocity parameters that   correlate the residual internal carotid diameter with that of the more   distal vessel in accordance with a method such as the [place]   Symptomatic Carotid Endarterectomy Trial (NASCET).      < end of copied text >

## 2019-05-15 NOTE — DISCHARGE NOTE NURSING/CASE MANAGEMENT/SOCIAL WORK - NSDCPEPTSTRK_GEN_ALL_CORE
Need for follow up after discharge/Prescribed medications/Risk factors for stroke/Stroke warning signs and symptoms/Signs and symptoms of stroke/Call 911 for stroke/Stroke support groups for patients, families, and friends/Stroke education booklet

## 2019-05-15 NOTE — PROGRESS NOTE ADULT - ASSESSMENT
73 years old man with multiple vascular risk factors including age, HTN, HPLD, CAD s/p coronary stents and "bilateral carotid stenoses" is evaluated at [facility] for acute onset of language disturbance in the form of expressive aphasia lasting for a few hours. MRI brain during hospitalization showed acute left MCA distribution infarct in the left frontal lobe. CTA head and neck to my eye shows severe proximal left ICA stenosis. TTE did not show any obvious structural cardiac source of embolism nor showed any evidence of a PFO on color Doppler. CUS confirmed hemodynamically significant severe L ICA stenosis (70-99%). on 5/15, he underwent carotid revascularization with carotid endarterectomy and tolerated procedure well.    Impression:  Cerebral embolism with cerebral infarction. Left MCA distribution stroke - likely mechanism being large vessel disease i.e. extracranial/proximal ICA severe stenosis leading to artery to artery embolism s/p CEA for secondary stroke prevention    NEURO: neurologically without acute change, continue close monitoring for neurologic deterioration, normotensive blood pressure goal after successful carotid endarterectomy as for vascular surgery team, titrate statin to LDL goal less than 70 - continue high intensity statin during likely atheroembolic etiology of his stroke, MR imaging as noted. Physical therapy/OT eval for home with no needs.     ANTITHROMBOTIC THERAPY: Aspirin for secondary stroke prevention. S/P CEA for secondary stroke prevention      PULMONARY: CXR clear, protecting airway, saturating well     CARDIOVASCULAR:  TTE: LVEF 65%, mitral annular calcification, mildly thickened aortic valve leaflets, cardiac monitoring without acute events noted, Cardiology consult appreciated: acceptable cardiac risk to proceed.                            SBP goal: normotension as per vascular surgery team    GASTROINTESTINAL:  dysphagia screen passed, tolerating diet       Diet: as per vascular surgery team/post CEA protocol    RENAL: BUN/Cr within range, maintain adequate hydration, good urine output      Na Goal: Greater than 135     Ivey: n    HEMATOLOGY: H/H within range, Platelets 235, no active bleeding noted      DVT ppx: recommend to start pharmacological DVT prophylaxis once appropriate from vascular surgery team with respect to recent CEA     ID: afebrile,  leukocytosis  resolved, no si/sx of infection    OTHER: current medical condition and plan of care d/w patient and family at bedside per request, questions and concerns addressed. Discussed with the patient regarding possible enrollment in Stroke-AF clinical trial - awaiting final decision.     DISPOSITION: Home.    CORE MEASURES:        Admission NIHSS: 0     TPA: [] YES [x] NO      LDL/HDL: 84/41     Depression Screen: 0     Statin Therapy: y      Dysphagia Screen: [x] PASS [] FAIL     Smoking [] YES [x] NO      Afib [] YES [x] NO     Stroke Education [x] YES [] NO    Obtain screening lower extremity venous ultrasound in patients who meet 1 or more of the following criteria as patient is high risk for DVT/PE on admission:   [] History of DVT/PE  []Hypercoagulable states (Factor V Leiden, Cancer, OCP, etc. )  []Prolonged immobility (hemiplegia/hemiparesis/post operative or any other extended immobilization)  [] Transferred from outside facility (Rehab or Long term care)  [] Age </= to 50

## 2019-05-15 NOTE — DISCHARGE NOTE NURSING/CASE MANAGEMENT/SOCIAL WORK - NSDCDPATPORTLINK_GEN_ALL_CORE
You can access the [facility] Patient Portal, offered by [facility], by registering with the following website: [email or web address]

## 2019-05-15 NOTE — DISCHARGE NOTE NURSING/CASE MANAGEMENT/SOCIAL WORK - NSDCPNINST_GEN_ALL_CORE
If you have any fever greater than 100.3, any swelling or drainage from incision site or difficulty swallowing contact MD office immediately and return to Emergency Room.

## 2019-05-16 ENCOUNTER — TRANSCRIPTION ENCOUNTER (OUTPATIENT)
Age: 74
End: 2019-05-16

## 2019-05-16 VITALS
HEART RATE: 66 BPM | OXYGEN SATURATION: 95 % | SYSTOLIC BLOOD PRESSURE: 128 MMHG | DIASTOLIC BLOOD PRESSURE: 68 MMHG | TEMPERATURE: 99 F | RESPIRATION RATE: 18 BRPM

## 2019-05-16 LAB
ANION GAP SERPL CALC-SCNC: 10 MMOL/L — SIGNIFICANT CHANGE UP (ref 5–17)
BUN SERPL-MCNC: 14 MG/DL — SIGNIFICANT CHANGE UP (ref 7–23)
CALCIUM SERPL-MCNC: 9.1 MG/DL — SIGNIFICANT CHANGE UP (ref 8.4–10.5)
CHLORIDE SERPL-SCNC: 99 MMOL/L — SIGNIFICANT CHANGE UP (ref 96–108)
CO2 SERPL-SCNC: 27 MMOL/L — SIGNIFICANT CHANGE UP (ref 22–31)
CREAT SERPL-MCNC: 0.92 MG/DL — SIGNIFICANT CHANGE UP (ref 0.5–1.3)
GLUCOSE SERPL-MCNC: 103 MG/DL — HIGH (ref 70–99)
HCT VFR BLD CALC: 41.3 % — SIGNIFICANT CHANGE UP (ref 39–50)
HGB BLD-MCNC: 13.6 G/DL — SIGNIFICANT CHANGE UP (ref 13–17)
MAGNESIUM SERPL-MCNC: 2.2 MG/DL — SIGNIFICANT CHANGE UP (ref 1.6–2.6)
MCHC RBC-ENTMCNC: 29.5 PG — SIGNIFICANT CHANGE UP (ref 27–34)
MCHC RBC-ENTMCNC: 33 GM/DL — SIGNIFICANT CHANGE UP (ref 32–36)
MCV RBC AUTO: 89.2 FL — SIGNIFICANT CHANGE UP (ref 80–100)
PHOSPHATE SERPL-MCNC: 3.4 MG/DL — SIGNIFICANT CHANGE UP (ref 2.5–4.5)
PLATELET # BLD AUTO: 197 K/UL — SIGNIFICANT CHANGE UP (ref 150–400)
POTASSIUM SERPL-MCNC: 3.7 MMOL/L — SIGNIFICANT CHANGE UP (ref 3.5–5.3)
POTASSIUM SERPL-SCNC: 3.7 MMOL/L — SIGNIFICANT CHANGE UP (ref 3.5–5.3)
RBC # BLD: 4.63 M/UL — SIGNIFICANT CHANGE UP (ref 4.2–5.8)
RBC # FLD: 13 % — SIGNIFICANT CHANGE UP (ref 10.3–14.5)
SODIUM SERPL-SCNC: 136 MMOL/L — SIGNIFICANT CHANGE UP (ref 135–145)
WBC # BLD: 12.8 K/UL — HIGH (ref 3.8–10.5)
WBC # FLD AUTO: 12.8 K/UL — HIGH (ref 3.8–10.5)

## 2019-05-16 PROCEDURE — 82962 GLUCOSE BLOOD TEST: CPT

## 2019-05-16 PROCEDURE — 88311 DECALCIFY TISSUE: CPT

## 2019-05-16 PROCEDURE — 85027 COMPLETE CBC AUTOMATED: CPT

## 2019-05-16 PROCEDURE — 83036 HEMOGLOBIN GLYCOSYLATED A1C: CPT

## 2019-05-16 PROCEDURE — 88304 TISSUE EXAM BY PATHOLOGIST: CPT

## 2019-05-16 PROCEDURE — C1889: CPT

## 2019-05-16 PROCEDURE — 99285 EMERGENCY DEPT VISIT HI MDM: CPT | Mod: 25

## 2019-05-16 PROCEDURE — 99233 SBSQ HOSP IP/OBS HIGH 50: CPT

## 2019-05-16 PROCEDURE — 80053 COMPREHEN METABOLIC PANEL: CPT

## 2019-05-16 PROCEDURE — C1769: CPT

## 2019-05-16 PROCEDURE — 93306 TTE W/DOPPLER COMPLETE: CPT

## 2019-05-16 PROCEDURE — 70498 CT ANGIOGRAPHY NECK: CPT

## 2019-05-16 PROCEDURE — 93880 EXTRACRANIAL BILAT STUDY: CPT

## 2019-05-16 PROCEDURE — 70450 CT HEAD/BRAIN W/O DYE: CPT

## 2019-05-16 PROCEDURE — 70551 MRI BRAIN STEM W/O DYE: CPT

## 2019-05-16 PROCEDURE — 80048 BASIC METABOLIC PNL TOTAL CA: CPT

## 2019-05-16 PROCEDURE — G0378: CPT

## 2019-05-16 PROCEDURE — 80061 LIPID PANEL: CPT

## 2019-05-16 PROCEDURE — 86850 RBC ANTIBODY SCREEN: CPT

## 2019-05-16 PROCEDURE — 84100 ASSAY OF PHOSPHORUS: CPT

## 2019-05-16 PROCEDURE — 83735 ASSAY OF MAGNESIUM: CPT

## 2019-05-16 PROCEDURE — 85730 THROMBOPLASTIN TIME PARTIAL: CPT

## 2019-05-16 PROCEDURE — 71046 X-RAY EXAM CHEST 2 VIEWS: CPT

## 2019-05-16 PROCEDURE — 86901 BLOOD TYPING SEROLOGIC RH(D): CPT

## 2019-05-16 PROCEDURE — 86900 BLOOD TYPING SEROLOGIC ABO: CPT

## 2019-05-16 PROCEDURE — 70496 CT ANGIOGRAPHY HEAD: CPT

## 2019-05-16 PROCEDURE — 85610 PROTHROMBIN TIME: CPT

## 2019-05-16 PROCEDURE — 86803 HEPATITIS C AB TEST: CPT

## 2019-05-16 PROCEDURE — 97165 OT EVAL LOW COMPLEX 30 MIN: CPT

## 2019-05-16 PROCEDURE — 93005 ELECTROCARDIOGRAM TRACING: CPT

## 2019-05-16 PROCEDURE — 97162 PT EVAL MOD COMPLEX 30 MIN: CPT

## 2019-05-16 RX ORDER — ATORVASTATIN CALCIUM 80 MG/1
80 TABLET, FILM COATED ORAL AT BEDTIME
Refills: 0 | Status: DISCONTINUED | OUTPATIENT
Start: 2019-05-16 | End: 2019-05-16

## 2019-05-16 RX ADMIN — Medication 650 MILLIGRAM(S): at 05:45

## 2019-05-16 RX ADMIN — Medication 650 MILLIGRAM(S): at 05:15

## 2019-05-16 RX ADMIN — Medication 81 MILLIGRAM(S): at 12:01

## 2019-05-16 NOTE — DISCHARGE NOTE PROVIDER - NSDCFUADDINST_GEN_ALL_CORE_FT
Follow up with Dr. Dozier in 1 week, call for appointment: [phone]  Leave dressing intact for 2 days, at which point you may remove it gently  Leave the steri strips intact, they will fall off on their own  You may shower, pat your incision dry, do not submerge the incision underwater  Resume regular diet, no heavy lifting until cleared by Dr. Dozier  Resume home meds, be sure to take your aspirin and crestor daily

## 2019-05-16 NOTE — PROVIDER CONTACT NOTE (OTHER) - ASSESSMENT
Pt at rest/sleeping in bed, a/ox4 neuro checks intact, with SBP<110  and HR <60, no s/s of distress.

## 2019-05-16 NOTE — OCCUPATIONAL THERAPY INITIAL EVALUATION ADULT - LIGHT TOUCH SENSATION, LUE, REHAB EVAL
pt reports slight tingling in fingertips that occasionally worsens with repetitive use of hand
mild impairment

## 2019-05-16 NOTE — PROGRESS NOTE ADULT - SUBJECTIVE AND OBJECTIVE BOX
Subjective: Patient seen and examined. No new events except as noted.   POD 1 L CEA  feels ok   neck is sore on the left       REVIEW OF SYSTEMS:    CONSTITUTIONAL: + weakness, fevers or chills  EYES/ENT: No visual changes;  No vertigo or throat pain   NECK: No pain or stiffness  RESPIRATORY: No cough, wheezing, hemoptysis; No shortness of breath  CARDIOVASCULAR: No chest pain or palpitations  GASTROINTESTINAL: No abdominal or epigastric pain. No nausea, vomiting, or hematemesis; No diarrhea or constipation. No melena or hematochezia.  GENITOURINARY: No dysuria, frequency or hematuria  NEUROLOGICAL: No numbness or weakness  SKIN: No itching, burning, rashes, or lesions   All other review of systems is negative unless indicated above.    MEDICATIONS:  MEDICATIONS  (STANDING):  aspirin enteric coated 81 milliGRAM(s) Oral daily  atorvastatin 80 milliGRAM(s) Oral at bedtime  dextrose 5% + sodium chloride 0.45% with potassium chloride 20 mEq/L 1000 milliLiter(s) (75 mL/Hr) IV Continuous <Continuous>      PHYSICAL EXAM:  T(C): 37.1 (05-16-19 @ 09:02), Max: 37.2 (05-16-19 @ 05:12)  HR: 75 (05-16-19 @ 09:02) (48 - 80)  BP: 139/67 (05-16-19 @ 10:37) (102/51 - 164/74)  RR: 17 (05-16-19 @ 09:02) (14 - 19)  SpO2: 92% (05-16-19 @ 09:02) (92% - 98%)  Wt(kg): --  I&O's Summary    15 May 2019 07:01  -  16 May 2019 07:00  --------------------------------------------------------  IN: 921.4 mL / OUT: 1665 mL / NET: -743.6 mL    16 May 2019 07:01  -  16 May 2019 12:12  --------------------------------------------------------  IN: 0 mL / OUT: 1290 mL / NET: -1290 mL          Appearance: Normal	  HEENT:   Normal oral mucosa, PERRL, EOMI, L CEA covered 	  Lymphatic: No lymphadenopathy , no edema  Cardiovascular: Normal S1 S2, No JVD, No murmurs , Peripheral pulses palpable 2+ bilaterally  Respiratory: Lungs clear to auscultation, normal effort 	  Gastrointestinal:  Soft, Non-tender, + BS	  Skin: No rashes, No ecchymoses, No cyanosis, warm to touch  Musculoskeletal: Normal range of motion, normal strength  Psychiatry:  Mood & affect appropriate  Ext: No edema      LABS:    CARDIAC MARKERS:                                13.6   12.8  )-----------( 197      ( 16 May 2019 06:56 )             41.3     05-16    136  |  99  |  14  ----------------------------<  103<H>  3.7   |  27  |  0.92    Ca    9.1      16 May 2019 06:55  Phos  3.4     05-16  Mg     2.2     05-16      proBNP:   Lipid Profile:   HgA1c:   TSH:             TELEMETRY: 	    ECG:  	  RADIOLOGY:   DIAGNOSTIC TESTING:  [ ] Echocardiogram:  [ ]  Catheterization:  [ ] Stress Test:    OTHER:

## 2019-05-16 NOTE — DISCHARGE NOTE PROVIDER - CARE PROVIDER_API CALL
Jl Dozier)  Vascular Surgery  [address], [address]  [place] [address]  Phone: [phone]  Fax: [phone]  Follow Up Time:

## 2019-05-16 NOTE — OCCUPATIONAL THERAPY INITIAL EVALUATION ADULT - ANTICIPATED DISCHARGE DISPOSITION, OT EVAL
no needs/pending re-assessment after CEA (surgery not scheduled yet)
Home with no skilled OT needs, pt I in ADLs and functional mobility.

## 2019-05-16 NOTE — OCCUPATIONAL THERAPY INITIAL EVALUATION ADULT - PRECAUTIONS/LIMITATIONS, REHAB EVAL
Pt is a 72 yo M with a PMH of HTN, HLD, CAD (s/p stent 2/2014), b/l Carotid Stenosis and GERD who is presenting with complaint of episode of "word finding difficulty" lasting 1.5 minutes this AM w/o recurrence. LKN 1000. On exam pt with 10% decreased sensation in parts of V3 on the right, otherwise nonfocal. 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 Hx and exam consistent with TIA, etiology likely being large to small artery embolism from stenotic left ICA causing single episode of aphasia.
Pt is a 74 yo M with a PMH of HTN, HLD, CAD (s/p stent 2/2014), b/l Carotid Stenosis and GERD who is presenting with complaint of episode of "word finding difficulty" lasting 1.5 minutes this AM w/o recurrence. LKN 1000. On exam pt with 10% decreased sensation in parts of V3 on the right, otherwise nonfocal. 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 Hx and exam consistent with TIA, etiology likely being large to small artery embolism from stenotic left ICA causing single episode of aphasia.

## 2019-05-16 NOTE — PROGRESS NOTE ADULT - REASON FOR ADMISSION
carotid stenosis

## 2019-05-16 NOTE — OCCUPATIONAL THERAPY INITIAL EVALUATION ADULT - MUSCLE TONE ASSESSMENT, REHAB EVAL
bilateral upper extremities/bilateral lower extremities/normal
right-side extremities/normal/left-side extremities

## 2019-05-16 NOTE — PROGRESS NOTE ADULT - PROBLEM SELECTOR PLAN 1
Severe L iCA stenosis   s/p CEA  pain control   ASA and statin
Severe L iCA stenosis   vascular surgery following. Acceptable cardiac risk to proceed. Normal LVEF and METS > 4. No anginal symptoms.   heparin gtt   ASA and statin
Severe L iCA stenosis   vascular surgery following   heparin gtt   ASA and statin

## 2019-05-16 NOTE — PROGRESS NOTE ADULT - SUBJECTIVE AND OBJECTIVE BOX
Vascular Surgery Progress Note    Subjective:   Pt seen & examined at bedside. Pain is well controlled. No new complaints. No F/C, N/V, CP/SOB.    Medications:  aspirin enteric coated 81      Vital Signs Last 24 Hrs  T(C): 37.1 (16 May 2019 09:02), Max: 37.2 (16 May 2019 05:12)  T(F): 98.7 (16 May 2019 09:02), Max: 99 (16 May 2019 05:12)  HR: 75 (16 May 2019 09:02) (48 - 80)  BP: 123/62 (16 May 2019 09:02) (102/51 - 164/74)  BP(mean): 85 (15 May 2019 22:00) (72 - 105)  RR: 17 (16 May 2019 09:02) (14 - 19)  SpO2: 92% (16 May 2019 09:02) (92% - 98%)    I&O's Summary    15 May 2019 07:01  -  16 May 2019 07:00  --------------------------------------------------------  IN: 921.4 mL / OUT: 1665 mL / NET: -743.6 mL    16 May 2019 07:01  -  16 May 2019 11:33  --------------------------------------------------------  IN: 0 mL / OUT: 1290 mL / NET: -1290 mL        Physical Exam:  Gen: NAD, resting comfortably in bed  Neck: dressing in place, mild serous discharge, changed at bedside.  Resp: No increased WOB on RA  Extr: 5/5  strength BUEREFUGIO, neurovascularly intact BLE     LABS:                        13.6   12.8  )-----------( 197      ( 16 May 2019 06:56 )             41.3     05-16    136  |  99  |  14  ----------------------------<  103<H>  3.7   |  27  |  0.92    Ca    9.1      16 May 2019 06:55  Phos  3.4     05-16  Mg     2.2     05-16      PT/INR - ( 15 May 2019 09:31 )   PT: 11.7 sec;   INR: 1.02 ratio         PTT - ( 15 May 2019 09:31 )  PTT:44.9 sec

## 2019-05-16 NOTE — PROGRESS NOTE ADULT - ASSESSMENT
ASSESSMENT: Patient is a 73M w/ PMHx as noted with months of intermittent right sided facial numbness and today brief moment of "word finding difficulty"  now resolved with CTA of neck revealing 70% occlusion of L ICA with concurrent findings on duplex.  Now POD1 s/p L CEA, doing well postoperatively.    PLAN:    - continue home meds  - ASA/statin  - regular diet, IV lock  - vargas out  - ambulate as tolerated  - Dispo: home today

## 2019-05-16 NOTE — DISCHARGE NOTE PROVIDER - HOSPITAL COURSE
Pt is a 72 yo M with a PMH of HTN, HLD, CAD (s/p stent 2/2014), b/l Carotid Stenosis and GERD who is presenting with complaint of episode of "word finding difficulty" lasting 1.5 minutes this AM w/o recurrence. LKN 1000. Pt notes 10% decreased sensation on the right jaw line and lip corner which has been waxing and waning over the past several weeks. Otherwise pt denies prior word finding difficulties, recent fevers, chills, HA, dizziness, vision changes. Now s/p Left carotid endarterectomy. Both intra-op course has remained without medical/surgical complications. Upon discharge pt. was able to void without difficulty after vargas removal, tolerating diet, ambulating freely. Pt is a 72 yo M with a PMH of HTN, HLD, CAD (s/p stent 2/2014), b/l Carotid Stenosis and GERD who is presenting with complaint of episode of "word finding difficulty" lasting 1.5 minutes this AM w/o recurrence. LKN 1000. Pt notes 10% decreased sensation on the right jaw line and lip corner which has been waxing and waning over the past several weeks. Otherwise pt denies prior word finding difficulties, recent fevers, chills, HA, dizziness, vision changes. Now s/p Left carotid endarterectomy. Both intra-op course has remained without medical/surgical complications. Upon discharge pt. was able to void without difficulty after vargas removal, tolerating diet, ambulating freely.  The patient was deemed stable for discharge, all questions were answered, and the patient was in agreement with discharge plan.

## 2019-05-16 NOTE — PROGRESS NOTE ADULT - NSHPATTENDINGPLANDISCUSS_GEN_ALL_CORE
Neurology residents, PAs and medical students on stroke service
Neurology residents, PAs, NP and medical students on stroke service
Neurology resident, PA, NP and medical students on stroke service
Neurology resident, PAs and medical students on stroke service

## 2019-05-16 NOTE — PROGRESS NOTE ADULT - PROVIDER SPECIALTY LIST ADULT
Cardiology
Cardiology
Neurology
Vascular Surgery
Cardiology
Neurology
Vascular Surgery
Cardiology
Cardiology

## 2019-05-16 NOTE — PROGRESS NOTE ADULT - PROBLEM SELECTOR PROBLEM 3
TIA (transient ischemic attack)

## 2019-05-16 NOTE — PROGRESS NOTE ADULT - ASSESSMENT
73 years old man with multiple vascular risk factors including age, HTN, HPLD, CAD s/p coronary stents and "bilateral carotid stenoses" is evaluated at [facility] for acute onset of language disturbance in the form of expressive aphasia lasting for a few hours. MRI brain during hospitalization showed acute left MCA distribution infarct in the left frontal lobe. CTA head and neck to my eye shows severe proximal left ICA stenosis. TTE did not show any obvious structural cardiac source of embolism nor showed any evidence of a PFO on color Doppler. CUS confirmed hemodynamically significant severe L ICA stenosis (70-99%).     Impression:  Cerebral embolism with cerebral infarction. Left MCA distribution stroke - likely mechanism being large vessel disease i.e. extracranial/proximal ICA severe stenosis leading to artery to artery embolism    NEURO: neurologically without acute change, s/p carotid endarterectomy on 5/15, allow for gradual normotension and avoid hypotension, Continue high intensity statin during likely atheroembolic etiology of his stroke, MR imaging as noted. Physical therapy/OT eval for home with no needs.     ANTITHROMBOTIC THERAPY: Aspirin for secondary stroke prevention.     PULMONARY: CXR clear, protecting airway, saturating well     CARDIOVASCULAR:  TTE: LVEF 65%, mitral annular calcification, mildly thickened aortic valve leaflets, cardiac monitoring without acute events noted, Cardiology consult appreciated: acceptable cardiac risk to proceed.                            SBP goal: 100-160    GASTROINTESTINAL:  dysphagia screen passed, tolerating diet       Diet: regular    RENAL: BUN/Cr within range, maintain adequate hydration, good urine output      Na Goal: Greater than 135     Ivey: n    HEMATOLOGY: H/H within range, Platelets 197, no active bleeding noted      DVT ppx: no indications of pharmacological DVT prophylaxis in the setting of therapeutic  anticoagulation use    ID: afebrile, slight leukocytosis, no overt si/sx of infection    OTHER: current medical condition and plan of care d/w patient and family at bedside per request, questions and concerns addressed. Refused Stroke-AF clinical trial.     DISPOSITION: Home on 5/16    CORE MEASURES:        Admission NIHSS: 0     TPA: [] YES [x] NO      LDL/HDL: 84/41     Depression Screen: 0     Statin Therapy: y      Dysphagia Screen: [x] PASS [] FAIL     Smoking [] YES [x] NO      Afib [] YES [x] NO     Stroke Education [x] YES [] NO    Obtain screening lower extremity venous ultrasound in patients who meet 1 or more of the following criteria as patient is high risk for DVT/PE on admission:   [] History of DVT/PE  []Hypercoagulable states (Factor V Leiden, Cancer, OCP, etc. )  []Prolonged immobility (hemiplegia/hemiparesis/post operative or any other extended immobilization)  [] Transferred from outside facility (Rehab or Long term care)  [] Age </= to 50 73 years old man with multiple vascular risk factors including age, HTN, HPLD, CAD s/p coronary stents and "bilateral carotid stenoses" is evaluated at [facility] for acute onset of language disturbance in the form of expressive aphasia lasting for a few hours. MRI brain during hospitalization showed acute left MCA distribution infarct in the left frontal lobe. CTA head and neck to my eye shows severe proximal left ICA stenosis. TTE did not show any obvious structural cardiac source of embolism nor showed any evidence of a PFO on color Doppler. CUS confirmed hemodynamically significant severe L ICA stenosis (70-99%). on 5/15, he underwent carotid revascularization with carotid endarterectomy and tolerated procedure well.    Impression:  Cerebral embolism with cerebral infarction. Left MCA distribution stroke - likely mechanism being large vessel disease i.e. extracranial/proximal ICA severe stenosis leading to artery to artery embolism s/p CEA for secondary stroke prevention    NEURO: neurologically without acute change, continue close monitoring for neurologic deterioration, normotensive blood pressure goal after successful carotid endarterectomy as for vascular surgery team, titrate statin to LDL goal less than 70 - continue high intensity statin during likely atheroembolic etiology of his stroke, MR imaging as noted. Physical therapy/OT eval for home with no needs.     ANTITHROMBOTIC THERAPY: Aspirin for secondary stroke prevention. S/P CEA for secondary stroke prevention      PULMONARY: CXR clear, protecting airway, saturating well     CARDIOVASCULAR:  TTE: LVEF 65%, mitral annular calcification, mildly thickened aortic valve leaflets, cardiac monitoring without acute events noted, Cardiology consult appreciated: acceptable cardiac risk to proceed.                            SBP goal: maintained normotension in the setting of recent carotid revascularization    GASTROINTESTINAL:  dysphagia screen passed, tolerating diet       Diet: regular    RENAL: BUN/Cr within range, maintain adequate hydration, good urine output      Na Goal: Greater than 135     Ivey: n    HEMATOLOGY: H/H within range, Platelets 235, no active bleeding noted      DVT ppx: recommend to start pharmacological DVT prophylaxis once appropriate from vascular surgery team with respect to recent CEA     ID: afebrile,  leukocytosis  resolved, no si/sx of infection    OTHER: current medical condition and plan of care d/w patient and family at bedside per request, questions and concerns addressed. Discussed with the patient regarding possible enrollment in Stroke-AF clinical trial - patient did not wish to proceed.     DISPOSITION: Home on 5/16    CORE MEASURES:        Admission NIHSS: 0     TPA: [] YES [x] NO      LDL/HDL: 84/41     Depression Screen: 0     Statin Therapy: y      Dysphagia Screen: [x] PASS [] FAIL     Smoking [] YES [x] NO      Afib [] YES [x] NO     Stroke Education [x] YES [] NO    Obtain screening lower extremity venous ultrasound in patients who meet 1 or more of the following criteria as patient is high risk for DVT/PE on admission:   [] History of DVT/PE  []Hypercoagulable states (Factor V Leiden, Cancer, OCP, etc. )  []Prolonged immobility (hemiplegia/hemiparesis/post operative or any other extended immobilization)  [] Transferred from outside facility (Rehab or Long term care)  [] Age </= to 50

## 2019-05-16 NOTE — OCCUPATIONAL THERAPY INITIAL EVALUATION ADULT - RANGE OF MOTION EXAMINATION, LOWER EXTREMITY
bilateral LE Active ROM was WFL  (within functional limits)
bilateral LE Active ROM was WNL (within normal limits)

## 2019-05-16 NOTE — PROGRESS NOTE ADULT - PROBLEM SELECTOR PLAN 2
s/p PCI   Normal LVEF
s/p PCI   Follow up TTE results

## 2019-05-16 NOTE — OCCUPATIONAL THERAPY INITIAL EVALUATION ADULT - DIAGNOSIS, OT EVAL
mild sensory issues in B hands Pt I in all ADLs and functional mobility, some sensory loss in B hands and mild R inattention

## 2019-05-16 NOTE — PROGRESS NOTE ADULT - SUBJECTIVE AND OBJECTIVE BOX
THE PATIENT WAS SEEN AND EXAMINED BY ME WITH THE HOUSESTAFF AND STROKE TEAM DURING MORNING ROUNDS.     HPI:  Pt is a 74 yo M with a PMH of HTN, HLD, CAD (s/p stent 2/2014), b/l Carotid Stenosis and GERD who  presented with complaint of episode of "word finding difficulty" lasting 1.5 minutes  morning of admission w/o recurrence. LKN 1000 day of admission. Pt noted a 10% decreased sensation on the right jaw line and lip corner which has been waxing and waning over the past several weeks. Otherwise pt denied prior word finding difficulties, recent fevers, chills, HA, dizziness, vision changes. NIHSS: 0. ABCD2 Score: 3. MRS: 0.      SUBJECTIVE: No events overnight.  No new neurologic complaints.      ROS: All negative except documented above.    acetaminophen   Tablet .. 650 milliGRAM(s) Oral every 6 hours PRN  aspirin enteric coated 81 milliGRAM(s) Oral daily  atorvastatin 80 milliGRAM(s) Oral at bedtime  dextrose 5% + sodium chloride 0.45% with potassium chloride 20 mEq/L 1000 milliLiter(s) IV Continuous <Continuous>    PHYSICAL EXAM:   Vital Signs Last 24 Hrs  T(C): 37.1 (16 May 2019 13:20), Max: 37.2 (16 May 2019 05:12)  T(F): 98.8 (16 May 2019 13:20), Max: 99 (16 May 2019 05:12)  HR: 66 (16 May 2019 13:20) (48 - 75)  BP: 128/68 (16 May 2019 13:20) (102/51 - 139/67)  BP(mean): 85 (15 May 2019 22:00) (72 - 92)  RR: 18 (16 May 2019 13:20) (14 - 19)  SpO2: 95% (16 May 2019 13:20) (92% - 98%)    General: No acute distress  HEENT: EOM intact, visual fields full  Abdomen: Soft, nontender, nondistended   Extremities: No edema    NEUROLOGICAL EXAM:  Mental status: Awake, alert, oriented x3, follows commands, speech fluent, no neglect, normal memory   Cranial Nerves: No facial asymmetry, no nystagmus, no dysarthria,  tongue midline  Motor exam: Normal tone, no drift, 5/5 RUE, 5/5 RLE, 5/5 LUE, 5/5 LLE, trace decreased fine finger movements on right  Sensation: Intact to light touch   Coordination/ Gait: no hemiataxia     LABS:                        13.6   12.8  )-----------( 197      ( 16 May 2019 06:56 )             41.3    05-16    136  |  99  |  14  ----------------------------<  103<H>  3.7   |  27  |  0.92    Ca    9.1      16 May 2019 06:55  Phos  3.4     05-16  Mg     2.2     05-16    PT/INR - ( 15 May 2019 09:31 )   PT: 11.7 sec;   INR: 1.02 ratio       PTT - ( 15 May 2019 09:31 )  PTT:44.9 sec  Hemoglobin A1C, Whole Blood: 5.7 % (05-11 @ 10:17)    IMAGING: Reviewed by me.     MR Head No Cont (05.11.19)   Acute infarct in the high left posterior frontal cortical   region with diffusion hyperintensity and restricted diffusion on the ADC   map. No associated hemorrhage or mass effect noted at this time.   Background of microvascular ischemic changes.    CT Angio head Neck w/ IV Cont (05.10.19)   Evaluation of the brain demonstrates a high attenuation focus in the left   centrum semiovale ovale suspicious for mineralization in association with   the possible cavernoma or an old area of hemorrhage or infection.  Evidence of a 70% stenosis of the left internal carotid artery with a   calcified plaque at its origin at the level of the carotid bulb. The   right internal carotid artery origin is unremarkable     VA Duplex Carotid, Bilat (05.11.19)   Bulky calcified plaque at the origin of the left internal   carotid artery creating a hemodynamically significant stenosis. The   degree of luminal narrowing is estimated at 70-99% by diameter,   compatible with findings at recent MRA.  Mild calcific plaque at the origin of the right internal carotid artery   without duplex evidence of hemodynamically significant stenosis. THE PATIENT WAS SEEN AND EXAMINED BY ME WITH THE HOUSESTAFF AND STROKE TEAM DURING MORNING ROUNDS.     HPI:  Pt is a 72 yo M with a PMH of HTN, HLD, CAD (s/p stent 2/2014), b/l Carotid Stenosis and GERD who  presented with complaint of episode of "word finding difficulty" lasting 1.5 minutes  morning of admission w/o recurrence. LKN 1000 day of admission. Pt noted a 10% decreased sensation on the right jaw line and lip corner which has been waxing and waning over the past several weeks. Otherwise pt denied prior word finding difficulties, recent fevers, chills, HA, dizziness, vision changes. NIHSS: 0. ABCD2 Score: 3. MRS: 0.      SUBJECTIVE: No events overnight.  No new neurologic complaints.      ROS: All negative except documented above.    acetaminophen   Tablet .. 650 milliGRAM(s) Oral every 6 hours PRN  aspirin enteric coated 81 milliGRAM(s) Oral daily  atorvastatin 80 milliGRAM(s) Oral at bedtime  dextrose 5% + sodium chloride 0.45% with potassium chloride 20 mEq/L 1000 milliLiter(s) IV Continuous <Continuous>    PHYSICAL EXAM:   Vital Signs Last 24 Hrs  T(C): 37.1 (16 May 2019 13:20), Max: 37.2 (16 May 2019 05:12)  T(F): 98.8 (16 May 2019 13:20), Max: 99 (16 May 2019 05:12)  HR: 66 (16 May 2019 13:20) (48 - 75)  BP: 128/68 (16 May 2019 13:20) (102/51 - 139/67)  BP(mean): 85 (15 May 2019 22:00) (72 - 92)  RR: 18 (16 May 2019 13:20) (14 - 19)  SpO2: 95% (16 May 2019 13:20) (92% - 98%)    General: No acute distress, dressing over the left CEA site   HEENT: EOM intact, visual fields full  Abdomen: Soft, nontender, nondistended   Extremities: No edema    NEUROLOGICAL EXAM:  Mental status: Awake, alert, oriented x3, follows commands, speech fluent, no neglect, normal memory   Cranial Nerves: No facial asymmetry, no nystagmus, no dysarthria,  tongue midline  Motor exam: Normal tone, no drift, 5/5 RUE, 5/5 RLE, 5/5 LUE, 5/5 LLE, trace decreased fine finger movements on right  Sensation: Intact to light touch   Coordination/ Gait: no hemiataxia     LABS:                        13.6   12.8  )-----------( 197      ( 16 May 2019 06:56 )             41.3    05-16    136  |  99  |  14  ----------------------------<  103<H>  3.7   |  27  |  0.92    Ca    9.1      16 May 2019 06:55  Phos  3.4     05-16  Mg     2.2     05-16    PT/INR - ( 15 May 2019 09:31 )   PT: 11.7 sec;   INR: 1.02 ratio       PTT - ( 15 May 2019 09:31 )  PTT:44.9 sec  Hemoglobin A1C, Whole Blood: 5.7 % (05-11 @ 10:17)    IMAGING: Reviewed by me.     MR Head No Cont (05.11.19)   Acute infarct in the high left posterior frontal cortical   region with diffusion hyperintensity and restricted diffusion on the ADC   map. No associated hemorrhage or mass effect noted at this time.   Background of microvascular ischemic changes.    CT Angio head Neck w/ IV Cont (05.10.19)   Evaluation of the brain demonstrates a high attenuation focus in the left   centrum semiovale ovale suspicious for mineralization in association with   the possible cavernoma or an old area of hemorrhage or infection.  Evidence of a 70% stenosis of the left internal carotid artery with a   calcified plaque at its origin at the level of the carotid bulb. The   right internal carotid artery origin is unremarkable     VA Duplex Carotid, Bilat (05.11.19)   Bulky calcified plaque at the origin of the left internal   carotid artery creating a hemodynamically significant stenosis. The   degree of luminal narrowing is estimated at 70-99% by diameter,   compatible with findings at recent MRA.  Mild calcific plaque at the origin of the right internal carotid artery   without duplex evidence of hemodynamically significant stenosis.

## 2019-05-23 ENCOUNTER — APPOINTMENT (OUTPATIENT)
Dept: VASCULAR SURGERY | Facility: CLINIC | Age: 74
End: 2019-05-23
Payer: COMMERCIAL

## 2019-05-23 VITALS
TEMPERATURE: 98 F | HEIGHT: 72 IN | BODY MASS INDEX: 27.36 KG/M2 | WEIGHT: 202 LBS | HEART RATE: 69 BPM | SYSTOLIC BLOOD PRESSURE: 167 MMHG | DIASTOLIC BLOOD PRESSURE: 85 MMHG

## 2019-05-23 LAB — SURGICAL PATHOLOGY STUDY: SIGNIFICANT CHANGE UP

## 2019-05-23 PROCEDURE — 99024 POSTOP FOLLOW-UP VISIT: CPT

## 2019-05-23 NOTE — PHYSICAL EXAM
[JVD] : no jugular venous distention  [Normal Breath Sounds] : Normal breath sounds [de-identified] : Appears well [de-identified] : incision C/D

## 2019-05-23 NOTE — REASON FOR VISIT
[de-identified] : Left carotid endarterectomy [de-identified] : Approximately one week ago patient underwent left carotid endarterectomy for preoperative CVA. Patient notes improvement in his verbal function

## 2019-05-27 ENCOUNTER — TRANSCRIPTION ENCOUNTER (OUTPATIENT)
Age: 74
End: 2019-05-27

## 2019-05-28 LAB
HCV AB S/CO SERPL IA: 0.12 S/CO — SIGNIFICANT CHANGE UP (ref 0–0.99)
HCV AB SERPL-IMP: SIGNIFICANT CHANGE UP

## 2019-05-31 ENCOUNTER — APPOINTMENT (OUTPATIENT)
Dept: NEUROLOGY | Facility: CLINIC | Age: 74
End: 2019-05-31
Payer: COMMERCIAL

## 2019-05-31 VITALS — DIASTOLIC BLOOD PRESSURE: 69 MMHG | SYSTOLIC BLOOD PRESSURE: 135 MMHG | HEART RATE: 52 BPM

## 2019-05-31 DIAGNOSIS — G47.30 SLEEP APNEA, UNSPECIFIED: ICD-10-CM

## 2019-05-31 DIAGNOSIS — I25.10 ATHEROSCLEROTIC HEART DISEASE OF NATIVE CORONARY ARTERY W/OUT ANGINA PECTORIS: ICD-10-CM

## 2019-05-31 DIAGNOSIS — Z82.3 FAMILY HISTORY OF STROKE: ICD-10-CM

## 2019-05-31 PROCEDURE — 99214 OFFICE O/P EST MOD 30 MIN: CPT

## 2019-05-31 RX ORDER — ASCORBIC ACID 500 MG
TABLET ORAL
Refills: 0 | Status: ACTIVE | COMMUNITY

## 2019-06-20 ENCOUNTER — APPOINTMENT (OUTPATIENT)
Dept: VASCULAR SURGERY | Facility: CLINIC | Age: 74
End: 2019-06-20
Payer: COMMERCIAL

## 2019-06-20 PROCEDURE — 99024 POSTOP FOLLOW-UP VISIT: CPT

## 2019-06-20 PROCEDURE — 93880 EXTRACRANIAL BILAT STUDY: CPT

## 2019-06-20 NOTE — PHYSICAL EXAM
[JVD] : no jugular venous distention  [de-identified] : appears well  [de-identified] : incision clean and dry, healed nicely

## 2019-06-20 NOTE — DISCUSSION/SUMMARY
[FreeTextEntry1] : 72 yo male with history of cva s/p left cea presents for follow up\par duplex shows right ica with 15-49% stenosis and widely patent left cea \par pt to continue with asa and will schedule for follow up in 6 months

## 2019-06-20 NOTE — REASON FOR VISIT
[de-identified] : 5/15/19 [de-identified] : left carotid endarterectomy  [de-identified] : 72 yo male with history of cva s/p left cea presents for follow up\par pt without any complaints\par pt states that the weakness and the speech has improved slightly

## 2019-07-26 ENCOUNTER — APPOINTMENT (OUTPATIENT)
Dept: NEUROLOGY | Facility: CLINIC | Age: 74
End: 2019-07-26
Payer: COMMERCIAL

## 2019-07-26 VITALS
BODY MASS INDEX: 28.71 KG/M2 | SYSTOLIC BLOOD PRESSURE: 127 MMHG | HEIGHT: 72 IN | DIASTOLIC BLOOD PRESSURE: 71 MMHG | WEIGHT: 212 LBS | HEART RATE: 50 BPM

## 2019-07-26 PROCEDURE — 99215 OFFICE O/P EST HI 40 MIN: CPT

## 2019-07-26 PROCEDURE — 93888 INTRACRANIAL LIMITED STUDY: CPT

## 2019-07-26 RX ORDER — ASPIRIN 81 MG
81 TABLET, DELAYED RELEASE (ENTERIC COATED) ORAL
Refills: 0 | Status: ACTIVE | COMMUNITY

## 2019-07-26 RX ORDER — ROSUVASTATIN CALCIUM 5 MG/1
5 TABLET, FILM COATED ORAL DAILY
Refills: 0 | Status: ACTIVE | COMMUNITY

## 2019-07-26 NOTE — PHYSICAL EXAM
[General Appearance - Alert] : alert [General Appearance - In No Acute Distress] : in no acute distress [Oriented To Time, Place, And Person] : oriented to person, place, and time [Impaired Insight] : insight and judgment were intact [Affect] : the affect was normal [Person] : oriented to person [Time] : oriented to time [Place] : oriented to place [Visual Intact] : visual attention was ~T not ~L decreased [Concentration Intact] : normal concentrating ability [Naming Objects] : no difficulty naming common objects [Repeating Phrases] : no difficulty repeating a phrase [Writing A Sentence] : no difficulty writing a sentence [Fluency] : fluency intact [Comprehension] : comprehension intact [Reading] : reading intact [Past History] : adequate knowledge of personal past history [Cranial Nerves Oculomotor (III)] : extraocular motion intact [Cranial Nerves Optic (II)] : visual acuity intact bilaterally,  visual fields full to confrontation, pupils equal round and reactive to light [Cranial Nerves Trigeminal (V)] : facial sensation intact symmetrically [Cranial Nerves Facial (VII)] : face symmetrical [Cranial Nerves Vestibulocochlear (VIII)] : hearing was intact bilaterally [Cranial Nerves Glossopharyngeal (IX)] : tongue and palate midline [Cranial Nerves Accessory (XI - Cranial And Spinal)] : head turning and shoulder shrug symmetric [Cranial Nerves Hypoglossal (XII)] : there was no tongue deviation with protrusion [Motor Tone] : muscle tone was normal in all four extremities [Motor Strength] : muscle strength was normal in all four extremities [No Muscle Atrophy] : normal bulk in all four extremities [Abnormal Walk] : normal gait [Tactile Decrease Entire Left Side Of Face] : diminished left side of the face [Balance] : balance was intact [2+] : Ankle jerk left 2+ [PERRL With Normal Accommodation] : pupils were equal in size, round, reactive to light, with normal accommodation [Sclera] : the sclera and conjunctiva were normal [Paresis Pronator Drift Right-Sided] : no pronator drift on the right [Paresis Pronator Drift Left-Sided] : no pronator drift on the left [Motor Strength Upper Extremities Bilaterally] : strength was normal in both upper extremities [Motor Strength Lower Extremities Bilaterally] : strength was normal in both lower extremities [Romberg's Sign] : Romberg's sign was negtive [Past-pointing] : there was no past-pointing [Tremor] : no tremor present [Plantar Reflex Right Only] : normal on the right [Plantar Reflex Left Only] : normal on the left [FreeTextEntry6] : 5/5 in all extremities

## 2019-07-26 NOTE — DISCUSSION/SUMMARY
[Intensive Blood Pressure Control] : intensive blood pressure control [Antithrombotic therapy with ___] : antithrombotic therapy with  [unfilled] [Lipid Lowering Therapy] : lipid lowering therapy [Patient encouraged to discuss with Primary MD] : I encouraged the patient to discuss these important issues with ~his/her~ primary care doctor [Goals and Counseling] : I have reviewed the goals of stroke risk factor modification. I counseled the patient on measures to reduce stroke risk, including the importance of medication compliance, risk factor control, exercise, healthy diet and avoidance of smoking. I reviewed stroke warning signs and symptoms and appropriate actions to take if such occur. [FreeTextEntry1] : Impression:\par Cerebral embolism with cerebral infarction. Left MCA distribution stroke - likely mechanism being large vessel disease i.e. extracranial/proximal ICA severe stenosis leading to artery to artery embolism s/p CEA for secondary stroke prevention\par \par He feels good, I encouraged him to keep up with job.\par \par - Continue ASA 81 mg once daily for secondary stroke prevention\par - Continue to monitor BP at home and notify PCP/Cardiology for readings >140/90. Advised to maintain a log of BP readings. Educated on medication compliance and BP monitoring to prevent secondary stroke and systemic problems \par - Continue statin therapy for secondary stroke prevention LDL 84. He is now back on statins.\par - Follow up with PCP for statin management and primary care needs such as regular blood work including monitoring of HbA1c (5.7) and cholesterol profile (goal LDL <70), to modify vascular risk factors \par - Follow up with sleep disorder specialist for further evaluation of sleep apnea. Patient has a CPAP machine but states it is uncomfortable and therefore does not wear it. Referral placed \par - TCD- reviewed within normal limits.\par - Educated on stroke/TIA signs/bleeding signs and symptoms and to call 911 if experiencing any of these symptoms. Patient aware that if he develops a headache that is associated with neurological signs or symptoms or associated with elevated BP that does not respond to medication to go to ED \par \par Cardiology: Alphonse Redding [phone]\par

## 2019-07-26 NOTE — HISTORY OF PRESENT ILLNESS
[FreeTextEntry1] : Mr. Bonner is a 73 year old male PMHx HTN, HLD, CAD (s/p stent 2/2014), b/l Carotid Stenosis and GERD who presents today for post hospitalization follow up after a Left MCA distribution stroke on 5/11/2019. s/p L CEA on 5/15/2019.\par \par No new neurological symptoms. 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 No tingling numbness or weakness. \par \par \par HPI:\par Pt is a 72 yo M who  presented with complaint of episode of "word finding difficulty" lasting 1.5 minutes  morning of admission w/o recurrence. LKN 1000 day of admission. Pt noted a 10% decreased sensation on the right jaw line and lip corner which has been waxing and waning over the past several weeks. Otherwise pt denied prior word finding difficulties, recent fevers, chills, HA, dizziness, vision changes. NIHSS: 0. ABCD2 Score: 3. MRS: 0.   MRI brain during hospitalization showed acute left MCA distribution infarct in the left frontal lobe. CTA head and neck to my eye shows severe proximal left ICA stenosis. TTE did not show any obvious structural cardiac source of embolism nor showed any evidence of a PFO on color Doppler. CUS confirmed hemodynamically significant severe L ICA stenosis (70-99%). on 5/15, he underwent carotid revascularization with carotid endarterectomy and tolerated procedure well.\par \par CT Head/CTA head and neck 5.10.19\par Evaluation of the brain demonstrates a high attenuation focus in the left \par centrum semiovale ovale suspicious for mineralization in association with \par the possible cavernoma or an old area of hemorrhage or infection.\par Evidence of a 70% stenosis of the left internal carotid artery with a \par calcified plaque at its origin at the level of the carotid bulb. The \par right internal carotid artery origin is unremarkable\par \par MRI Head 5.11.19\par Acute infarct in the high left posterior frontal cortical \par region with diffusion hyperintensity and restricted diffusion on the ADC \par map. No associated hemorrhage or mass effect noted at this time. \par Background of microvascular ischemic changes.\par \par BL Carotid Doppler 5.11.19\par Bulky calcified plaque at the origin of the left internal \par carotid artery creating a hemodynamically significant stenosis. The \par degree of luminal narrowing is estimated at 70-99% by diameter, \par compatible with findings at recent MRA.\par \par Mild calcific plaque at the origin of the right internal carotid artery \par without duplex evidence of hemodynamically significant stenosis.\par \par TTE 5.12.19\par Mitral Valve: Normal mitral valve. Mitral annular\par calcification.\par Aortic Valve/Aorta: Mildly thickened aortic valve leaflets.\par \par Normal aortic root size. (Ao: 3.9 cm at the sinuses of\par Valsalva).\par Left Atrium: Normal left atrium.  LA volume index = 22\par cc/m2.\par Left Ventricle: Normal left ventricular systolic function.\par No segmental wall motion abnormalities. Normal left\par ventricular internal dimensions and wall thickness.\par Impaired LV-relaxation with normal filling pressure.\par Right Heart: Normal right atrium. Normal right ventricular\par size and function. Normal tricuspid valve. Normal pulmonic\par valve.\par Pericardium/Pleura: Normal pericardium with no pericardial\par effusion.\par Hemodynamic: Estimated RA pressure is normal.\par Pulmonary artery pressure is normal.\par No PFO seen with color Doppler.\par \par

## 2019-07-26 NOTE — REASON FOR VISIT
[Post Hospitalization] : a post hospitalization visit [Spouse] : spouse [Family Member] : family member [Follow-Up: _____] : a [unfilled] follow-up visit [FreeTextEntry1] : stroke s/p CEA

## 2019-10-22 NOTE — CHART NOTE - NSCHARTNOTEFT_GEN_A_CORE
Detail Level: Detailed VASCULAR NEUROLOGY ATTENDING  The patient is seen and examined the history and imaging are reviewed. I agree with the resident H&P unless otherwise noted. Patient with symptomatic L ICA high grade stenosis. Agree with planned CEA.

## 2019-10-30 ENCOUNTER — APPOINTMENT (OUTPATIENT)
Dept: NEUROLOGY | Facility: CLINIC | Age: 74
End: 2019-10-30
Payer: COMMERCIAL

## 2019-10-30 VITALS — SYSTOLIC BLOOD PRESSURE: 146 MMHG | DIASTOLIC BLOOD PRESSURE: 82 MMHG

## 2019-10-30 PROCEDURE — 99214 OFFICE O/P EST MOD 30 MIN: CPT

## 2019-10-30 RX ORDER — NEBIVOLOL HYDROCHLORIDE 5 MG/1
5 TABLET ORAL DAILY
Refills: 0 | Status: DISCONTINUED | COMMUNITY
End: 2019-10-30

## 2019-12-19 ENCOUNTER — APPOINTMENT (OUTPATIENT)
Dept: VASCULAR SURGERY | Facility: CLINIC | Age: 74
End: 2019-12-19
Payer: COMMERCIAL

## 2019-12-19 PROCEDURE — 93880 EXTRACRANIAL BILAT STUDY: CPT

## 2019-12-19 PROCEDURE — 99213 OFFICE O/P EST LOW 20 MIN: CPT

## 2019-12-19 NOTE — ASSESSMENT
[FreeTextEntry1] : Patient underwent a carotid duplex study which shows no evidence of carotid stenosis. Patient also with palpable pedal pulses. Most likely this is spinal in nature. Followup in 6 months.

## 2019-12-19 NOTE — HISTORY OF PRESENT ILLNESS
[FreeTextEntry1] : 74-year-old gentleman with a history of CVA status post left carotid endarterectomy. Doing well. Has some complaints of leg discomfort. Has a history of sciatica. He is here for followup

## 2019-12-19 NOTE — PHYSICAL EXAM
[Normal Breath Sounds] : Normal breath sounds [JVD] : no jugular venous distention  [2+] : left 2+ [Ankle Swelling (On Exam)] : not present [Varicose Veins Of Lower Extremities] : not present [] : not present [No Rash or Lesion] : No rash or lesion [Abdomen Tenderness] : ~T ~M No abdominal tenderness [Calm] : calm [Alert] : alert [de-identified] : appears well [de-identified] : well-healed scar

## 2020-01-02 ENCOUNTER — APPOINTMENT (OUTPATIENT)
Dept: NEUROLOGY | Facility: CLINIC | Age: 75
End: 2020-01-02
Payer: COMMERCIAL

## 2020-01-02 VITALS
WEIGHT: 212 LBS | RESPIRATION RATE: 18 BRPM | OXYGEN SATURATION: 94 % | SYSTOLIC BLOOD PRESSURE: 147 MMHG | TEMPERATURE: 98.1 F | HEIGHT: 74 IN | DIASTOLIC BLOOD PRESSURE: 66 MMHG | HEART RATE: 58 BPM | BODY MASS INDEX: 27.21 KG/M2

## 2020-01-02 DIAGNOSIS — R29.898 OTHER SYMPTOMS AND SIGNS INVOLVING THE MUSCULOSKELETAL SYSTEM: ICD-10-CM

## 2020-01-02 PROCEDURE — 99214 OFFICE O/P EST MOD 30 MIN: CPT

## 2020-01-17 ENCOUNTER — APPOINTMENT (OUTPATIENT)
Dept: ENDOCRINOLOGY | Facility: CLINIC | Age: 75
End: 2020-01-17
Payer: COMMERCIAL

## 2020-01-17 VITALS
WEIGHT: 223 LBS | HEART RATE: 58 BPM | BODY MASS INDEX: 28.62 KG/M2 | DIASTOLIC BLOOD PRESSURE: 80 MMHG | SYSTOLIC BLOOD PRESSURE: 142 MMHG | HEIGHT: 74 IN | OXYGEN SATURATION: 94 %

## 2020-01-17 PROCEDURE — 99205 OFFICE O/P NEW HI 60 MIN: CPT

## 2020-01-22 NOTE — ASSESSMENT
[FreeTextEntry1] : 74 years \par hx of stroke \par CAD s/p stent \par HTN \par HLD \par \par follows with PCP \par \par here for concern of ED \par \par we discussed at length, natural progression of testostorne levels with age, \par we dsicussed correct workup of diagnosing low tesostosterone and how it should be tested with 2 mornings fasting samples \par pt also appears to have sleep apnea- snoring to address, sleep study d/w pcp \par HTN- needs better control \par will follow with cards to titrate medicine \par \par d/w pt sleep apnea, HTN, HLD can all affect ED given circulation \par \par \par we will start with morning tesosterone levels for now \par if abnl- than will do pit workup as needed \par also explained that pt continue to followup with urology (gets injections/cialis) \par \par we also discussed that we will do what is medically appriopate for him reagarding testing and treatment \par i d.w pt that tesosterone tx does not come without risks and it should be used in caution in pts with cardiac disease. he has both a hx of stroke and CAD and would use tesosterone with caution\par \par pt also should followup with PCP regarding ongoing medical care and followup of medical issues/concerns and exam- regarding HTN and sleep apnea \par

## 2020-01-22 NOTE — REVIEW OF SYSTEMS
[Erectile Dysfunction] : erectile dysfunction [Decreased Libido] : decreased libido [All other systems negative] : All other systems negative

## 2020-01-22 NOTE — PHYSICAL EXAM
[Well Nourished] : well nourished [Well Developed] : well developed [EOMI] : extra ocular movement intact [No Respiratory Distress] : no respiratory distress [Normal Rate and Effort] : normal respiratory rhythm and effort [No Accessory Muscle Use] : no accessory muscle use [Clear to Auscultation] : lungs were clear to auscultation bilaterally [Normal Rate] : heart rate was normal  [Normal S1, S2] : normal S1 and S2 [Regular Rhythm] : with a regular rhythm [Normal Gait] : normal gait [Normal Strength/Tone] : muscle strength and tone were normal [No Motor Deficits] : the motor exam was normal [No Tremors] : no tremors [Oriented x3] : oriented to person, place, and time [Normal Affect] : the affect was normal [Normal Mood] : the mood was normal

## 2020-04-08 ENCOUNTER — APPOINTMENT (OUTPATIENT)
Dept: NEUROLOGY | Facility: CLINIC | Age: 75
End: 2020-04-08

## 2020-06-11 NOTE — ED ADULT NURSE NOTE - CAS TRG GENERAL NORM CIRC DET
Addended by: KELSIE SAVAGE on: 6/11/2020 01:24 PM     Modules accepted: Orders     Strong peripheral pulses

## 2020-07-01 ENCOUNTER — APPOINTMENT (OUTPATIENT)
Dept: NEUROLOGY | Facility: CLINIC | Age: 75
End: 2020-07-01
Payer: COMMERCIAL

## 2020-07-01 VITALS
WEIGHT: 214 LBS | DIASTOLIC BLOOD PRESSURE: 71 MMHG | HEART RATE: 59 BPM | SYSTOLIC BLOOD PRESSURE: 166 MMHG | BODY MASS INDEX: 28.99 KG/M2 | HEIGHT: 72 IN

## 2020-07-01 VITALS — TEMPERATURE: 97.6 F

## 2020-07-01 DIAGNOSIS — R29.898 OTHER SYMPTOMS AND SIGNS INVOLVING THE MUSCULOSKELETAL SYSTEM: ICD-10-CM

## 2020-07-01 PROCEDURE — 99215 OFFICE O/P EST HI 40 MIN: CPT

## 2020-07-01 RX ORDER — LABETALOL HYDROCHLORIDE 300 MG/1
300 TABLET, FILM COATED ORAL
Refills: 0 | Status: DISCONTINUED | COMMUNITY
End: 2020-07-01

## 2020-07-01 RX ORDER — AMLODIPINE AND ATORVASTATIN 5; 20 MG/1; MG/1
5-20 TABLET, COATED ORAL
Refills: 0 | Status: ACTIVE | COMMUNITY

## 2020-07-01 RX ORDER — AMLODIPINE AND ATORVASTATIN 10; 40 MG/1; MG/1
10-40 TABLET, COATED ORAL DAILY
Refills: 0 | Status: DISCONTINUED | COMMUNITY
End: 2020-07-01

## 2020-07-01 RX ORDER — LABETALOL HYDROCHLORIDE 200 MG/1
200 TABLET, FILM COATED ORAL TWICE DAILY
Refills: 0 | Status: ACTIVE | COMMUNITY

## 2020-07-18 ENCOUNTER — APPOINTMENT (OUTPATIENT)
Dept: MRI IMAGING | Facility: CLINIC | Age: 75
End: 2020-07-18
Payer: COMMERCIAL

## 2020-07-18 ENCOUNTER — OUTPATIENT (OUTPATIENT)
Dept: OUTPATIENT SERVICES | Facility: HOSPITAL | Age: 75
LOS: 1 days | End: 2020-07-18
Payer: COMMERCIAL

## 2020-07-18 DIAGNOSIS — L05.91 PILONIDAL CYST WITHOUT ABSCESS: Chronic | ICD-10-CM

## 2020-07-18 DIAGNOSIS — Z00.8 ENCOUNTER FOR OTHER GENERAL EXAMINATION: ICD-10-CM

## 2020-07-18 DIAGNOSIS — Z95.9 PRESENCE OF CARDIAC AND VASCULAR IMPLANT AND GRAFT, UNSPECIFIED: Chronic | ICD-10-CM

## 2020-07-18 PROCEDURE — 70551 MRI BRAIN STEM W/O DYE: CPT

## 2020-07-18 PROCEDURE — 72148 MRI LUMBAR SPINE W/O DYE: CPT | Mod: 26

## 2020-07-18 PROCEDURE — 70551 MRI BRAIN STEM W/O DYE: CPT | Mod: 26

## 2020-07-18 PROCEDURE — 72148 MRI LUMBAR SPINE W/O DYE: CPT

## 2020-08-03 ENCOUNTER — APPOINTMENT (OUTPATIENT)
Dept: NEUROLOGY | Facility: CLINIC | Age: 75
End: 2020-08-03
Payer: COMMERCIAL

## 2020-08-03 VITALS — TEMPERATURE: 95.6 F

## 2020-08-03 PROCEDURE — 93888 INTRACRANIAL LIMITED STUDY: CPT

## 2020-08-03 PROCEDURE — 93880 EXTRACRANIAL BILAT STUDY: CPT

## 2020-08-18 ENCOUNTER — APPOINTMENT (OUTPATIENT)
Dept: ENDOCRINOLOGY | Facility: CLINIC | Age: 75
End: 2020-08-18

## 2020-11-20 ENCOUNTER — EMERGENCY (EMERGENCY)
Facility: HOSPITAL | Age: 75
LOS: 1 days | Discharge: HOME | End: 2020-11-20
Attending: EMERGENCY MEDICINE | Admitting: EMERGENCY MEDICINE
Payer: MEDICARE

## 2020-11-20 VITALS
WEIGHT: 242.51 LBS | RESPIRATION RATE: 16 BRPM | HEIGHT: 75 IN | TEMPERATURE: 98 F | SYSTOLIC BLOOD PRESSURE: 183 MMHG | OXYGEN SATURATION: 98 % | HEART RATE: 82 BPM | DIASTOLIC BLOOD PRESSURE: 77 MMHG

## 2020-11-20 VITALS
TEMPERATURE: 98 F | HEART RATE: 80 BPM | SYSTOLIC BLOOD PRESSURE: 136 MMHG | OXYGEN SATURATION: 95 % | RESPIRATION RATE: 16 BRPM | DIASTOLIC BLOOD PRESSURE: 75 MMHG

## 2020-11-20 DIAGNOSIS — Z95.9 PRESENCE OF CARDIAC AND VASCULAR IMPLANT AND GRAFT, UNSPECIFIED: Chronic | ICD-10-CM

## 2020-11-20 DIAGNOSIS — L05.91 PILONIDAL CYST WITHOUT ABSCESS: Chronic | ICD-10-CM

## 2020-11-20 PROCEDURE — 99283 EMERGENCY DEPT VISIT LOW MDM: CPT

## 2020-11-20 PROCEDURE — 51702 INSERT TEMP BLADDER CATH: CPT

## 2020-11-20 PROCEDURE — 99283 EMERGENCY DEPT VISIT LOW MDM: CPT | Mod: 25

## 2020-11-20 NOTE — ED PROVIDER NOTE - OBJECTIVE STATEMENT
Pt is a 74 yo male sp penile implant today by dr Elizabeth/Berna. pt tonight unable to urinate and sent to er by dr Ny for straight cath and then d/c with no catheter.  pt called in by dr elizabeth. Pt is a 74 yo male sp penile implant today by dr Elizabeth/Berna. pt tonight unable to urinate and sent to er by dr Ny for straight cath and then d/c with no catheter.  pt called in by dr elizabeth.  pt denies pain but sent to er as no urination by urology.  no bleeding

## 2020-11-20 NOTE — ED PROVIDER NOTE - FAMILY HISTORY
Father  Still living? Unknown  Family history of CABG, Age at diagnosis: Age Unknown  Family history of cerebrovascular accident (CVA), Age at diagnosis: Age Unknown  Family history of coronary artery disease, Age at diagnosis: Age Unknown

## 2020-11-20 NOTE — ED PROVIDER NOTE - CLINICAL SUMMARY MEDICAL DECISION MAKING FREE TEXT BOX
post penile prosthesis urinary retention likely due to anesthesia, straight cath removal per urology.  d/c , continue post op care, fu urology, return for pain,

## 2020-11-20 NOTE — ED PROVIDER NOTE - PROGRESS NOTE DETAILS
275 cc urine drained with 16f couday cath,  bladder scan with 40 cc remaining,unable to express more urine, catheter d/leona

## 2020-11-20 NOTE — ED PROVIDER NOTE - PATIENT PORTAL LINK FT
You can access the FollowMyHealth Patient Portal offered by [facility] by registering at the following website: [email or web address]. By joining [facility]’s FollowMyHealth portal, you will also be able to view your health information using other applications (apps) compatible with our system.

## 2020-11-20 NOTE — ED ADULT NURSE NOTE - CHPI ED NUR SYMPTOMS NEG
no dysuria/no fever/no chills/no blood in stool/no abdominal distension/no hematuria/no diarrhea/no nausea

## 2020-11-20 NOTE — ED ADULT NURSE NOTE - OBJECTIVE STATEMENT
Patient alert and oriented X 3. Complaining of urinary retention. Patient states he had surgery today to place a penile implant. Patient states last void was before the surgery at 1230. Patient states he did not have a Ivey cathter placed during surgery. + 3 edema noted to scrotum with ecchymosis and patient has a YAJAIRA drain to left groin. Site clean dry and intact with 15 ml of bloody drainage. Penis erect. Patient straight cath for urine with 16 Kiswahili coude by Dr. Collier. 275ml clear yellow urine returned.

## 2020-11-20 NOTE — ED ADULT NURSE NOTE - NSHOSCREENINGQ1_ED_ALL_ED
Last 5 Patient Entered Readings                                      Current 30 Day Average: 154/66     Recent Readings 7/16/2019 7/15/2019 7/14/2019 7/13/2019 7/12/2019    SBP (mmHg) 145 132 157 165 161    DBP (mmHg) 65 59 65 64 62    Pulse 57 59 53 63 53        Left voicemail for follow up       No

## 2021-01-04 ENCOUNTER — APPOINTMENT (OUTPATIENT)
Dept: NEUROLOGY | Facility: CLINIC | Age: 76
End: 2021-01-04
Payer: MEDICARE

## 2021-01-04 ENCOUNTER — APPOINTMENT (OUTPATIENT)
Dept: NEUROLOGY | Facility: CLINIC | Age: 76
End: 2021-01-04

## 2021-01-04 VITALS
WEIGHT: 224 LBS | DIASTOLIC BLOOD PRESSURE: 69 MMHG | SYSTOLIC BLOOD PRESSURE: 159 MMHG | HEART RATE: 56 BPM | HEIGHT: 72 IN | BODY MASS INDEX: 30.34 KG/M2

## 2021-01-04 PROCEDURE — 99213 OFFICE O/P EST LOW 20 MIN: CPT

## 2021-01-12 ENCOUNTER — APPOINTMENT (OUTPATIENT)
Dept: VASCULAR SURGERY | Facility: CLINIC | Age: 76
End: 2021-01-12
Payer: MEDICARE

## 2021-01-12 VITALS — TEMPERATURE: 96.6 F

## 2021-01-12 PROCEDURE — 93880 EXTRACRANIAL BILAT STUDY: CPT

## 2021-01-12 PROCEDURE — 99213 OFFICE O/P EST LOW 20 MIN: CPT

## 2021-01-12 NOTE — ASSESSMENT
[FreeTextEntry1] : 74 yo male with a history of htn, hld, cad, carotid stenosis with cva s/p left CEA presents for follow up\par \par carotid duplex shows right ica stenosis of 16-49% and left patent cea \par \par pt to continue with asa and crestor \par pt to follow up in 1 year with repeat duplex

## 2021-01-12 NOTE — HISTORY OF PRESENT ILLNESS
[FreeTextEntry1] : 74 yo male with a history of htn, hld, cad, carotid stenosis with cva s/p left CEA presents for follow up\par pt without any complaints \par pt denies any strokes or stroke like symptoms

## 2021-01-12 NOTE — PHYSICAL EXAM
[No Rash or Lesion] : No rash or lesion [Alert] : alert [Calm] : calm [JVD] : no jugular venous distention  [Skin Ulcer] : no ulcer [de-identified] : appears well  [de-identified] : no asymmetry

## 2021-01-28 NOTE — PROGRESS NOTE ADULT - PROBLEM SELECTOR PROBLEM 2
Coronary artery disease
Yes

## 2021-04-20 NOTE — ED ADULT NURSE NOTE - CHIEF COMPLAINT QUOTE
slurred speech since 1030 and numbness to right side of face and couldn't find his words this morning
Attending

## 2021-07-06 ENCOUNTER — APPOINTMENT (OUTPATIENT)
Dept: NEUROLOGY | Facility: CLINIC | Age: 76
End: 2021-07-06
Payer: MEDICARE

## 2021-07-06 VITALS
HEIGHT: 72 IN | WEIGHT: 216 LBS | BODY MASS INDEX: 29.26 KG/M2 | DIASTOLIC BLOOD PRESSURE: 76 MMHG | HEART RATE: 48 BPM | SYSTOLIC BLOOD PRESSURE: 154 MMHG

## 2021-07-06 DIAGNOSIS — Z98.890 OTHER SPECIFIED POSTPROCEDURAL STATES: ICD-10-CM

## 2021-07-06 PROCEDURE — 99214 OFFICE O/P EST MOD 30 MIN: CPT

## 2021-07-06 PROCEDURE — 93888 INTRACRANIAL LIMITED STUDY: CPT

## 2021-07-06 PROCEDURE — 93880 EXTRACRANIAL BILAT STUDY: CPT

## 2021-07-06 RX ORDER — OXYCODONE AND ACETAMINOPHEN 5; 325 MG/1; MG/1
5-325 TABLET ORAL
Qty: 15 | Refills: 0 | Status: DISCONTINUED | COMMUNITY
Start: 2021-05-03 | End: 2021-07-06

## 2021-07-06 RX ORDER — OXYBUTYNIN CHLORIDE 10 MG/1
10 TABLET, EXTENDED RELEASE ORAL
Qty: 30 | Refills: 0 | Status: DISCONTINUED | COMMUNITY
Start: 2021-05-05 | End: 2021-07-06

## 2021-07-06 RX ORDER — CEFUROXIME AXETIL 500 MG/1
500 TABLET ORAL
Qty: 10 | Refills: 0 | Status: DISCONTINUED | COMMUNITY
Start: 2020-12-29 | End: 2021-07-06

## 2021-07-06 RX ORDER — MIRABEGRON 50 MG/1
50 TABLET, FILM COATED, EXTENDED RELEASE ORAL
Qty: 30 | Refills: 0 | Status: DISCONTINUED | COMMUNITY
Start: 2021-05-05 | End: 2021-07-06

## 2021-11-08 ENCOUNTER — APPOINTMENT (OUTPATIENT)
Dept: SURGERY | Facility: CLINIC | Age: 76
End: 2021-11-08
Payer: MEDICARE

## 2021-11-08 VITALS
HEIGHT: 72 IN | OXYGEN SATURATION: 96 % | WEIGHT: 216 LBS | TEMPERATURE: 97.4 F | DIASTOLIC BLOOD PRESSURE: 78 MMHG | HEART RATE: 68 BPM | BODY MASS INDEX: 29.26 KG/M2 | SYSTOLIC BLOOD PRESSURE: 181 MMHG

## 2021-11-08 DIAGNOSIS — R10.33 PERIUMBILICAL PAIN: ICD-10-CM

## 2021-11-08 DIAGNOSIS — K43.6 OTHER AND UNSPECIFIED VENTRAL HERNIA WITH OBSTRUCTION, W/OUT GANGRENE: ICD-10-CM

## 2021-11-08 PROCEDURE — 99204 OFFICE O/P NEW MOD 45 MIN: CPT

## 2021-12-29 ENCOUNTER — OUTPATIENT (OUTPATIENT)
Dept: OUTPATIENT SERVICES | Facility: HOSPITAL | Age: 76
LOS: 1 days | End: 2021-12-29
Payer: MEDICARE

## 2021-12-29 VITALS
WEIGHT: 220.46 LBS | SYSTOLIC BLOOD PRESSURE: 156 MMHG | DIASTOLIC BLOOD PRESSURE: 69 MMHG | TEMPERATURE: 98 F | HEIGHT: 72 IN | OXYGEN SATURATION: 98 % | HEART RATE: 62 BPM | RESPIRATION RATE: 15 BRPM

## 2021-12-29 DIAGNOSIS — Z01.818 ENCOUNTER FOR OTHER PREPROCEDURAL EXAMINATION: ICD-10-CM

## 2021-12-29 DIAGNOSIS — Z98.890 OTHER SPECIFIED POSTPROCEDURAL STATES: Chronic | ICD-10-CM

## 2021-12-29 DIAGNOSIS — L05.91 PILONIDAL CYST WITHOUT ABSCESS: Chronic | ICD-10-CM

## 2021-12-29 DIAGNOSIS — Z96.0 PRESENCE OF UROGENITAL IMPLANTS: Chronic | ICD-10-CM

## 2021-12-29 DIAGNOSIS — Z95.9 PRESENCE OF CARDIAC AND VASCULAR IMPLANT AND GRAFT, UNSPECIFIED: Chronic | ICD-10-CM

## 2021-12-29 DIAGNOSIS — Z87.19 PERSONAL HISTORY OF OTHER DISEASES OF THE DIGESTIVE SYSTEM: ICD-10-CM

## 2021-12-29 DIAGNOSIS — K43.6 OTHER AND UNSPECIFIED VENTRAL HERNIA WITH OBSTRUCTION, WITHOUT GANGRENE: ICD-10-CM

## 2021-12-29 DIAGNOSIS — Z90.6 ACQUIRED ABSENCE OF OTHER PARTS OF URINARY TRACT: Chronic | ICD-10-CM

## 2021-12-29 LAB
ALBUMIN SERPL ELPH-MCNC: 3.9 G/DL — SIGNIFICANT CHANGE UP (ref 3.3–5)
ALP SERPL-CCNC: 51 U/L — SIGNIFICANT CHANGE UP (ref 40–120)
ALT FLD-CCNC: 51 U/L — SIGNIFICANT CHANGE UP (ref 12–78)
ANION GAP SERPL CALC-SCNC: 6 MMOL/L — SIGNIFICANT CHANGE UP (ref 5–17)
AST SERPL-CCNC: 22 U/L — SIGNIFICANT CHANGE UP (ref 15–37)
BILIRUB SERPL-MCNC: 0.7 MG/DL — SIGNIFICANT CHANGE UP (ref 0.2–1.2)
BUN SERPL-MCNC: 15 MG/DL — SIGNIFICANT CHANGE UP (ref 7–23)
CALCIUM SERPL-MCNC: 9.6 MG/DL — SIGNIFICANT CHANGE UP (ref 8.5–10.1)
CHLORIDE SERPL-SCNC: 106 MMOL/L — SIGNIFICANT CHANGE UP (ref 96–108)
CO2 SERPL-SCNC: 29 MMOL/L — SIGNIFICANT CHANGE UP (ref 22–31)
CREAT SERPL-MCNC: 0.92 MG/DL — SIGNIFICANT CHANGE UP (ref 0.5–1.3)
GLUCOSE SERPL-MCNC: 104 MG/DL — HIGH (ref 70–99)
HCT VFR BLD CALC: 46.1 % — SIGNIFICANT CHANGE UP (ref 39–50)
HGB BLD-MCNC: 15.3 G/DL — SIGNIFICANT CHANGE UP (ref 13–17)
MCHC RBC-ENTMCNC: 28.8 PG — SIGNIFICANT CHANGE UP (ref 27–34)
MCHC RBC-ENTMCNC: 33.2 GM/DL — SIGNIFICANT CHANGE UP (ref 32–36)
MCV RBC AUTO: 86.8 FL — SIGNIFICANT CHANGE UP (ref 80–100)
NRBC # BLD: 0 /100 WBCS — SIGNIFICANT CHANGE UP (ref 0–0)
PLATELET # BLD AUTO: 255 K/UL — SIGNIFICANT CHANGE UP (ref 150–400)
POTASSIUM SERPL-MCNC: 3.8 MMOL/L — SIGNIFICANT CHANGE UP (ref 3.5–5.3)
POTASSIUM SERPL-SCNC: 3.8 MMOL/L — SIGNIFICANT CHANGE UP (ref 3.5–5.3)
PROT SERPL-MCNC: 7.6 G/DL — SIGNIFICANT CHANGE UP (ref 6–8.3)
RBC # BLD: 5.31 M/UL — SIGNIFICANT CHANGE UP (ref 4.2–5.8)
RBC # FLD: 15.1 % — HIGH (ref 10.3–14.5)
SODIUM SERPL-SCNC: 141 MMOL/L — SIGNIFICANT CHANGE UP (ref 135–145)
WBC # BLD: 7.76 K/UL — SIGNIFICANT CHANGE UP (ref 3.8–10.5)
WBC # FLD AUTO: 7.76 K/UL — SIGNIFICANT CHANGE UP (ref 3.8–10.5)

## 2021-12-29 PROCEDURE — G0463: CPT

## 2021-12-29 PROCEDURE — 36415 COLL VENOUS BLD VENIPUNCTURE: CPT

## 2021-12-29 PROCEDURE — 85027 COMPLETE CBC AUTOMATED: CPT

## 2021-12-29 PROCEDURE — 80053 COMPREHEN METABOLIC PANEL: CPT

## 2021-12-29 RX ORDER — METOPROLOL TARTRATE 50 MG
1 TABLET ORAL
Qty: 0 | Refills: 0 | DISCHARGE

## 2021-12-29 NOTE — H&P PST ADULT - NSANTHOSAYNRD_GEN_A_CORE
No. JULI screening performed.  STOP BANG Legend: 0-2 = LOW Risk; 3-4 = INTERMEDIATE Risk; 5-8 = HIGH Risk

## 2021-12-29 NOTE — H&P PST ADULT - HISTORY OF PRESENT ILLNESS
75 yo M for repair ventral hernia w mesh     1/12/22  c/o  75 yo M for repair ventral hernia w mesh     1/12/22  c/o swelling at umbilicus  > 6 mos ago  increasing in size  over past 3 mos      no nausea or vomiting  eating ok

## 2021-12-29 NOTE — H&P PST ADULT - ASSESSMENT
77 yo M for repair ventral hernia w mesh  1/12/22     Continue asa 81     Med and cardiac cl pending

## 2021-12-29 NOTE — H&P PST ADULT - NSICDXPASTSURGICALHX_GEN_ALL_CORE_FT
PAST SURGICAL HISTORY:  Pilonidal cyst     S/P angioplasty with stent 2014     PAST SURGICAL HISTORY:  H/O carotid endarterectomy left   May 2019    H/O total cystectomy 2021    scrotum    History of penile implant     Pilonidal cyst     S/P angioplasty with stent 2014

## 2021-12-29 NOTE — H&P PST ADULT - NSICDXFAMILYHX_GEN_ALL_CORE_FT
FAMILY HISTORY:  Father  Still living? Unknown  Family history of CABG, Age at diagnosis: Age Unknown  Family history of cerebrovascular accident (CVA), Age at diagnosis: Age Unknown  Family history of coronary artery disease, Age at diagnosis: Age Unknown

## 2021-12-29 NOTE — H&P PST ADULT - NSICDXPASTMEDICALHX_GEN_ALL_CORE_FT
PAST MEDICAL HISTORY:  Carpal tunnel syndrome, right     Coronary artery disease stent placed 2014    Dyslipidemia     Hiatal hernia with GERD     Hypertension      PAST MEDICAL HISTORY:  Arthritis     Carpal tunnel syndrome, right     Cerebrovascular accident (CVA) 2019    Coronary artery disease stent placed 2014    Diverticulitis     Dyslipidemia     Hiatal hernia with GERD     Hypertension     Ventral hernia

## 2022-01-07 PROBLEM — M19.90 UNSPECIFIED OSTEOARTHRITIS, UNSPECIFIED SITE: Chronic | Status: ACTIVE | Noted: 2021-12-29

## 2022-01-07 PROBLEM — I63.9 CEREBRAL INFARCTION, UNSPECIFIED: Chronic | Status: ACTIVE | Noted: 2021-12-29

## 2022-01-07 PROBLEM — K43.9 VENTRAL HERNIA WITHOUT OBSTRUCTION OR GANGRENE: Chronic | Status: ACTIVE | Noted: 2021-12-29

## 2022-01-07 PROBLEM — K57.92 DIVERTICULITIS OF INTESTINE, PART UNSPECIFIED, WITHOUT PERFORATION OR ABSCESS WITHOUT BLEEDING: Chronic | Status: ACTIVE | Noted: 2021-12-29

## 2022-01-10 ENCOUNTER — OUTPATIENT (OUTPATIENT)
Dept: OUTPATIENT SERVICES | Facility: HOSPITAL | Age: 77
LOS: 1 days | End: 2022-01-10
Payer: MEDICARE

## 2022-01-10 DIAGNOSIS — Z20.828 CONTACT WITH AND (SUSPECTED) EXPOSURE TO OTHER VIRAL COMMUNICABLE DISEASES: ICD-10-CM

## 2022-01-10 DIAGNOSIS — Z95.9 PRESENCE OF CARDIAC AND VASCULAR IMPLANT AND GRAFT, UNSPECIFIED: Chronic | ICD-10-CM

## 2022-01-10 DIAGNOSIS — Z90.6 ACQUIRED ABSENCE OF OTHER PARTS OF URINARY TRACT: Chronic | ICD-10-CM

## 2022-01-10 DIAGNOSIS — L05.91 PILONIDAL CYST WITHOUT ABSCESS: Chronic | ICD-10-CM

## 2022-01-10 DIAGNOSIS — Z96.0 PRESENCE OF UROGENITAL IMPLANTS: Chronic | ICD-10-CM

## 2022-01-10 DIAGNOSIS — Z98.890 OTHER SPECIFIED POSTPROCEDURAL STATES: Chronic | ICD-10-CM

## 2022-01-10 LAB — SARS-COV-2 RNA SPEC QL NAA+PROBE: SIGNIFICANT CHANGE UP

## 2022-01-10 PROCEDURE — U0005: CPT

## 2022-01-10 PROCEDURE — U0003: CPT

## 2022-01-11 ENCOUNTER — APPOINTMENT (OUTPATIENT)
Dept: VASCULAR SURGERY | Facility: CLINIC | Age: 77
End: 2022-01-11

## 2022-01-11 ENCOUNTER — TRANSCRIPTION ENCOUNTER (OUTPATIENT)
Age: 77
End: 2022-01-11

## 2022-01-11 NOTE — ASU PATIENT PROFILE, ADULT - WILL THE PATIENT ACCEPT THE PFIZER COVID-19 VACCINE IF ELIGIBLE AND IT IS AVAILABLE?
2021       Vishnu Cantu MD  [address] [address]  Via Fax: [phone]      Patient: Estrella Pelaez   YOB: 1989   Date of Visit: 2021       Dear Dr. Cantu:    Thank you for referring Estrella Pelaez to me for evaluation. Below are my notes for this visit with her.    If you have questions, please do not hesitate to call me. I look forward to following your patient along with you.      Sincerely,        Barry Crespo MD        CC: No Recipients  Barry Crespo MD  2021  3:35 PM  Signed    Pregnancy complicated by :   1. Chronic migraine without aura without status migrainosus, not intractable    2. Arnold-Chiari malformation, type I (CMS/HCC)    3. History of gastric bypass       Estrella is doing well and currently denies signs of PPROM,  labor, vaginal bleeding, or preeclampsia. FM+       Impression:  [id number]    There is normal interval fetal growth since the previous exam.      Rou en Y: No signs of bowel obstruction.     Asthma: stable.       Recommendations:    Weekly NST/ILDEFONSO     Deliver ~ 39 week GA                                                  
No

## 2022-01-11 NOTE — ASU PATIENT PROFILE, ADULT - NSICDXPASTSURGICALHX_GEN_ALL_CORE_FT
PAST SURGICAL HISTORY:  H/O carotid endarterectomy left   May 2019    H/O total cystectomy 2021    scrotum    History of penile implant     Pilonidal cyst     S/P angioplasty with stent 2014

## 2022-01-11 NOTE — ASU PATIENT PROFILE, ADULT - FALL HARM RISK - UNIVERSAL INTERVENTIONS
Bed in lowest position, wheels locked, appropriate side rails in place/Call bell, personal items and telephone in reach/Instruct patient to call for assistance before getting out of bed or chair/Non-slip footwear when patient is out of bed/[place] to call system/Physically safe environment - no spills, clutter or unnecessary equipment/Purposeful Proactive Rounding/Room/bathroom lighting operational, light cord in reach

## 2022-01-11 NOTE — ASU PATIENT PROFILE, ADULT - NSICDXPASTMEDICALHX_GEN_ALL_CORE_FT
PAST MEDICAL HISTORY:  Arthritis     Carpal tunnel syndrome, right     Cerebrovascular accident (CVA) 2019    Coronary artery disease stent placed 2014    Diverticulitis     Dyslipidemia     Hiatal hernia with GERD     Hypertension     Ventral hernia

## 2022-01-12 ENCOUNTER — APPOINTMENT (OUTPATIENT)
Dept: SURGERY | Facility: HOSPITAL | Age: 77
End: 2022-01-12
Payer: MEDICARE

## 2022-01-12 ENCOUNTER — OUTPATIENT (OUTPATIENT)
Dept: OUTPATIENT SERVICES | Facility: HOSPITAL | Age: 77
LOS: 1 days | End: 2022-01-12
Payer: MEDICARE

## 2022-01-12 VITALS
SYSTOLIC BLOOD PRESSURE: 125 MMHG | HEART RATE: 56 BPM | OXYGEN SATURATION: 95 % | RESPIRATION RATE: 15 BRPM | DIASTOLIC BLOOD PRESSURE: 66 MMHG

## 2022-01-12 VITALS
TEMPERATURE: 98 F | DIASTOLIC BLOOD PRESSURE: 69 MMHG | OXYGEN SATURATION: 96 % | RESPIRATION RATE: 17 BRPM | HEART RATE: 57 BPM | SYSTOLIC BLOOD PRESSURE: 154 MMHG

## 2022-01-12 DIAGNOSIS — K43.6 OTHER AND UNSPECIFIED VENTRAL HERNIA WITH OBSTRUCTION, WITHOUT GANGRENE: ICD-10-CM

## 2022-01-12 DIAGNOSIS — L05.91 PILONIDAL CYST WITHOUT ABSCESS: Chronic | ICD-10-CM

## 2022-01-12 DIAGNOSIS — Z98.890 OTHER SPECIFIED POSTPROCEDURAL STATES: Chronic | ICD-10-CM

## 2022-01-12 DIAGNOSIS — Z96.0 PRESENCE OF UROGENITAL IMPLANTS: Chronic | ICD-10-CM

## 2022-01-12 DIAGNOSIS — Z95.9 PRESENCE OF CARDIAC AND VASCULAR IMPLANT AND GRAFT, UNSPECIFIED: Chronic | ICD-10-CM

## 2022-01-12 DIAGNOSIS — Z90.6 ACQUIRED ABSENCE OF OTHER PARTS OF URINARY TRACT: Chronic | ICD-10-CM

## 2022-01-12 PROCEDURE — C9399: CPT

## 2022-01-12 PROCEDURE — 49560: CPT | Mod: AS

## 2022-01-12 PROCEDURE — 49561: CPT

## 2022-01-12 PROCEDURE — 55520 REMOVAL OF SPERM CORD LESION: CPT | Mod: AS

## 2022-01-12 PROCEDURE — 49568: CPT

## 2022-01-12 PROCEDURE — 49560: CPT

## 2022-01-12 PROCEDURE — 55520 REMOVAL OF SPERM CORD LESION: CPT

## 2022-01-12 PROCEDURE — C1781: CPT

## 2022-01-12 DEVICE — MESH HERNIA PROCEED CIR MED VENTRAL 4.3X4.3CM: Type: IMPLANTABLE DEVICE | Status: FUNCTIONAL

## 2022-01-12 RX ORDER — ASCORBIC ACID 60 MG
0 TABLET,CHEWABLE ORAL
Qty: 0 | Refills: 0 | DISCHARGE

## 2022-01-12 RX ORDER — SODIUM CHLORIDE 9 MG/ML
1000 INJECTION, SOLUTION INTRAVENOUS
Refills: 0 | Status: DISCONTINUED | OUTPATIENT
Start: 2022-01-12 | End: 2022-01-12

## 2022-01-12 RX ORDER — HYDROCODONE BITARTRATE AND ACETAMINOPHEN 7.5; 325 MG/15ML; MG/15ML
1 SOLUTION ORAL
Qty: 30 | Refills: 0
Start: 2022-01-12

## 2022-01-12 RX ORDER — ZINC SULFATE TAB 220 MG (50 MG ZINC EQUIVALENT) 220 (50 ZN) MG
0 TAB ORAL
Qty: 0 | Refills: 0 | DISCHARGE

## 2022-01-12 RX ORDER — OXYCODONE HYDROCHLORIDE 5 MG/1
5 TABLET ORAL ONCE
Refills: 0 | Status: DISCONTINUED | OUTPATIENT
Start: 2022-01-12 | End: 2022-01-12

## 2022-01-12 RX ORDER — MIRABEGRON 50 MG/1
1 TABLET, EXTENDED RELEASE ORAL
Qty: 0 | Refills: 0 | DISCHARGE

## 2022-01-12 RX ORDER — ONDANSETRON 8 MG/1
4 TABLET, FILM COATED ORAL ONCE
Refills: 0 | Status: DISCONTINUED | OUTPATIENT
Start: 2022-01-12 | End: 2022-01-12

## 2022-01-12 RX ORDER — HYDROMORPHONE HYDROCHLORIDE 2 MG/ML
0.5 INJECTION INTRAMUSCULAR; INTRAVENOUS; SUBCUTANEOUS
Refills: 0 | Status: DISCONTINUED | OUTPATIENT
Start: 2022-01-12 | End: 2022-01-12

## 2022-01-12 RX ADMIN — SODIUM CHLORIDE 75 MILLILITER(S): 9 INJECTION, SOLUTION INTRAVENOUS at 08:42

## 2022-01-12 RX ADMIN — SODIUM CHLORIDE 50 MILLILITER(S): 9 INJECTION, SOLUTION INTRAVENOUS at 07:00

## 2022-01-12 NOTE — ASU DISCHARGE PLAN (ADULT/PEDIATRIC) - NS MD DC FALL RISK RISK
For information on Fall & Injury Prevention, visit: [email or web address] OR  [email or web address] OR  https://www.cdc.gov/steadi/patient.html

## 2022-01-12 NOTE — ASU DISCHARGE PLAN (ADULT/PEDIATRIC) - CARE PROVIDER_API CALL
Eduardo Goff (DO)  Surgery  [address] [facility], [address]  [place], [place] [id number]  Phone: [phone]  Fax: [phone]  Follow Up Time:

## 2022-01-12 NOTE — ASU DISCHARGE PLAN (ADULT/PEDIATRIC) - ASU DC SPECIAL INSTRUCTIONSFT
You may shower in 24 hours.  Do not remove steri-strips.  Do not let water hit wound directly, do not rub incision.      No heavy lifting (nothing heavier than 5 lbs.), no strenuous physical activity, no exercise.      You may perform your usual daily tasks as tolerated.      You may resume your usual daily diet as tolerated.     Do not drive for 24 hours after anesthesia.  Do not drive while taking narcotic pain medications.  Do not drive until pain free.      Take Norco as prescribed for severe pain.  Take over-the-counter 200mg ibuprofen pills every 6 hours for mild to moderate pain with food (2 pills for mild, 3 pills for moderate).  Take Milk of Magnesia (30cc twice a day) while taking narcotic pain medications.      You may resume your aspirin 24 hours after surgery.      Follow-up with Dr. Goff in his office as per office instructions discussed during your pre-operative office consultation. Yes

## 2022-01-27 ENCOUNTER — APPOINTMENT (OUTPATIENT)
Dept: SURGERY | Facility: CLINIC | Age: 77
End: 2022-01-27
Payer: MEDICARE

## 2022-01-27 VITALS — TEMPERATURE: 98 F

## 2022-01-27 DIAGNOSIS — Z87.19 OTHER SPECIFIED POSTPROCEDURAL STATES: ICD-10-CM

## 2022-01-27 DIAGNOSIS — Z98.890 OTHER SPECIFIED POSTPROCEDURAL STATES: ICD-10-CM

## 2022-01-27 PROCEDURE — 99024 POSTOP FOLLOW-UP VISIT: CPT

## 2022-03-07 ENCOUNTER — APPOINTMENT (OUTPATIENT)
Dept: SURGERY | Facility: CLINIC | Age: 77
End: 2022-03-07
Payer: MEDICARE

## 2022-03-07 VITALS — TEMPERATURE: 96.9 F

## 2022-03-07 DIAGNOSIS — Z98.890 OTHER SPECIFIED POSTPROCEDURAL STATES: ICD-10-CM

## 2022-03-07 DIAGNOSIS — Z87.19 OTHER SPECIFIED POSTPROCEDURAL STATES: ICD-10-CM

## 2022-03-07 PROCEDURE — 99024 POSTOP FOLLOW-UP VISIT: CPT

## 2022-03-25 ENCOUNTER — APPOINTMENT (OUTPATIENT)
Dept: NEUROLOGY | Facility: CLINIC | Age: 77
End: 2022-03-25
Payer: MEDICARE

## 2022-03-25 PROCEDURE — 93880 EXTRACRANIAL BILAT STUDY: CPT

## 2022-03-25 PROCEDURE — 93888 INTRACRANIAL LIMITED STUDY: CPT

## 2022-03-30 ENCOUNTER — APPOINTMENT (OUTPATIENT)
Dept: NEUROLOGY | Facility: CLINIC | Age: 77
End: 2022-03-30

## 2022-04-08 NOTE — H&P PST ADULT - FUNCTIONAL ASSESSMENT - BASIC MOBILITY ASSESSMENT TYPE
Spoke to JM and he stated no changes unless symptoms persist, FV with JA and continue to monitor BP/HR.        Admission

## 2022-05-03 ENCOUNTER — APPOINTMENT (OUTPATIENT)
Dept: UROLOGY | Facility: CLINIC | Age: 77
End: 2022-05-03
Payer: MEDICARE

## 2022-05-03 VITALS
WEIGHT: 209 LBS | RESPIRATION RATE: 16 BRPM | HEIGHT: 72 IN | OXYGEN SATURATION: 95 % | SYSTOLIC BLOOD PRESSURE: 139 MMHG | BODY MASS INDEX: 28.31 KG/M2 | TEMPERATURE: 97.2 F | DIASTOLIC BLOOD PRESSURE: 68 MMHG | HEART RATE: 59 BPM

## 2022-05-03 DIAGNOSIS — R37 SEXUAL DYSFUNCTION, UNSPECIFIED: ICD-10-CM

## 2022-05-03 DIAGNOSIS — Z86.39 PERSONAL HISTORY OF OTHER ENDOCRINE, NUTRITIONAL AND METABOLIC DISEASE: ICD-10-CM

## 2022-05-03 DIAGNOSIS — Z86.79 PERSONAL HISTORY OF OTHER DISEASES OF THE CIRCULATORY SYSTEM: ICD-10-CM

## 2022-05-03 DIAGNOSIS — N40.0 BENIGN PROSTATIC HYPERPLASIA WITHOUT LOWER URINARY TRACT SYMPMS: ICD-10-CM

## 2022-05-03 PROCEDURE — 99204 OFFICE O/P NEW MOD 45 MIN: CPT

## 2022-05-03 RX ORDER — OMEPRAZOLE 20 MG/1
TABLET, DELAYED RELEASE ORAL
Refills: 0 | Status: ACTIVE | COMMUNITY

## 2022-05-03 NOTE — REVIEW OF SYSTEMS
[Heartburn] : heartburn [Poor quality erections] : Poor quality erections [Seen by urologist before (Name)  ___] : Preciously seen by a urologist: [unfilled] [Wake up at night to urinate  How many times?  ___] : wakes up to urinate [unfilled] times during the night [Negative] : Heme/Lymph [see HPI] : see HPI [FreeTextEntry2] : htn

## 2022-05-03 NOTE — HISTORY OF PRESENT ILLNESS
[Erectile Dysfunction] : Erectile Dysfunction [FreeTextEntry1] : ADRIEN SAWANT, a 76 year old male, presented to the office with the chief complaint of anorgasmia. \par \par S/P Urolift \par \par Failed orals and even tried Trimix - failed that as wel. \par \par IPP done by Dr. Sun in November 2020 - \par -pump works well\par \par Concerned about weak ejaculate; after implant ejaculate was fine but now ejaculate is very weak\par \par T level taken on 3/21/22\par Total T: 334.09 ng/ dL\par \par \par \par

## 2022-05-03 NOTE — ASSESSMENT
[FreeTextEntry1] : Anorgasmia \par -no feeling of release of pleasure \par -check labs \par - T borderline\par - may consider empirically low dose TRT but first \par -start pramipexole \par vit B12/complex\par increase stimulation tactile \par \par RTC in 4 weeks to reassess \par I saw and evaluated the patient with nurse practitioner Gera Adams. \par I have confirmed the chief complaint, past medical, surgical, and social history.\par I have examined the patient. \par I have reviewed the note and interpreted auxiliary tests results. \par I have formulated the assessment and plan and discussed my recommendations of care to the nurse practitioner.\par I agree with the findings and the plan of care as documented by this  note which I edited as necessary.\par \par The submitted E/M billing level for this visit reflects the total time spent on the day of the visit including face-to-face time spent with the patient, non-face-to-face review of medical records and relevant information, documentation, and asynchronous communication with the patient after a visit via phone, email, or patient’s eHR portal after the visit.  \par \par The medical records reviewed are either scanned into the chart or reviewed with the patient using a patient’s electronic medical records portal for patients with records not available to [facility] via electronic transmission platforms from other institutions and labs. \par \par Time spend counseling and performing coordination of care was also included in determining the appropriate EM billing level.\par \par I have reviewed and verified information regarding the chief complaint and history recorded by the ancillary staff and/or the patient. I have independently reviewed and interpreted tests performed by other physicians and facilities as necessary. \par \par I have discussed with the patient differential diagnosis, reason for auxiliary tests if ordered, risks, benefits, alternatives, and complications of each form of therapy were discussed.

## 2022-05-03 NOTE — PHYSICAL EXAM
[General Appearance - Well Developed] : well developed [General Appearance - Well Nourished] : well nourished [Normal Appearance] : normal appearance [Well Groomed] : well groomed [General Appearance - In No Acute Distress] : no acute distress [Respiration, Rhythm And Depth] : normal respiratory rhythm and effort [Exaggerated Use Of Accessory Muscles For Inspiration] : no accessory muscle use [Normal Station and Gait] : the gait and station were normal for the patient's age [] : no rash [No Focal Deficits] : no focal deficits [Oriented To Time, Place, And Person] : oriented to person, place, and time [Affect] : the affect was normal [Mood] : the mood was normal [Not Anxious] : not anxious [Urethral Meatus] : meatus normal [Penis Abnormality] : normal circumcised penis [FreeTextEntry1] : testis descended; normal sensation on glans penis IPP in place

## 2022-06-02 ENCOUNTER — APPOINTMENT (OUTPATIENT)
Dept: UROLOGY | Facility: CLINIC | Age: 77
End: 2022-06-02
Payer: MEDICARE

## 2022-06-02 LAB
PROLACTIN SERPL-MCNC: 5.8 NG/ML
TESTOST FREE SERPL-MCNC: 7.3 PG/ML
TESTOST SERPL-MCNC: 418 NG/DL

## 2022-06-02 PROCEDURE — 99214 OFFICE O/P EST MOD 30 MIN: CPT

## 2022-06-02 NOTE — HISTORY OF PRESENT ILLNESS
[FreeTextEntry1] : Here for follow up of low ejaculatory volume\par IPP \par working well \par responded to pramipexole feels orgasm better\par \par still not satisfied \par \par wants to try TRT \par has low normal T \par

## 2022-06-02 NOTE — ASSESSMENT
[FreeTextEntry1] : ejaculatory dysfunction \par low normal testosterone \par start Axiron empirically for 3 months \par \par increase pramipexole to 0.5 mg \par no gambling issues\par \par RTC in 3 months\par \par Low testosterone.  Low testosterone can be caused by primary testicular failure or be due to secondary problems with pituitary or hypothalamic function.  Primary testicular failure typically is due to varicocele, orchitis, Klinefelter syndrome, Sertoli cell dysfunction, alcohol abuse, heavy metal chronic exposure, obesity due to elevated temperature in the scrotum, and others.  Typically in situations of primary testicular failure the LH and FSH are elevated and testosterone is low or low normal.  With secondary testicular failure typically LH and FSH are low or low normal sometimes not detectable.  Secondary testicular dysfunction can be due to pituitary adenomas, brain tumors, post radiation therapy to the brain, exogenous testosterone injection and abuse or production, trauma to the brain, Kallmann syndrome and other problems.\par \par Low testosterone can be associated with erectile dysfunction, low sex drive, loss of morning erections, fatigue, increased fat body mass, decrease in bone mineral density.\par \par Treatment of low testosterone focuses on correction of identifiable causes if possible otherwise testosterone replacement therapy can be initiated.  Testosterone replacement therapy can be used using topical agents or injectable agents.  Oral agents have been also available in [place] but experience with them is relatively limited.\par \par Topical testosterone replacement preparation allow for flexible dosing and achieve physiological levels of testosterone.  They are well-tolerated and have low risk of elevated hematocrit.\par \par They also have low risk of suppression of FSH and LH which can lead to secondary testicular atrophy.\par \par Injectable testosterone preparation suppressed natural testosterone production via suppression of LH and FSH.  They also have issues with very high levels after injection and then low levels before next injection.  There is higher risk of hematocrit increase with injectable testosterone preparations.  We typically start patient with topical testosterone preparation.  Testopel which is a pellet insertable under the skin every 3 to 4 months can also be used in patients who have stable symptoms and improved symptoms after testosterone replacement therapy.\par \par Testosterone pellets are inserted in the office.\par \par The risk of testosterone replacement therapy specifically possible increased risk of cardiovascular events like heart attack, strokes, deep vein thrombosis were discussed with the patient.  There is also literature indicating that low testosterone is associated with high risk of cardiovascular events.\par \par Different forms of therapy, risks, benefits and alternatives to therapy were discussed.  Need for adherence to the follow-up, periodic lab checks especially testosterone level, PSA, hematocrit and occasional liver function tests were discussed with the patient.  All questions were answered.\par

## 2022-06-09 ENCOUNTER — NON-APPOINTMENT (OUTPATIENT)
Age: 77
End: 2022-06-09

## 2022-06-09 PROBLEM — I63.512 ACUTE ISCHEMIC LEFT MCA STROKE: Status: RESOLVED | Noted: 2019-05-31 | Resolved: 2022-06-09

## 2022-07-01 NOTE — BRIEF OPERATIVE NOTE - VENOUS THROMBOEMBOLISM PROPHYLAXIS THERAPY
SCD Bactrim Counseling:  I discussed with the patient the risks of sulfa antibiotics including but not limited to GI upset, allergic reaction, drug rash, diarrhea, dizziness, photosensitivity, and yeast infections.  Rarely, more serious reactions can occur including but not limited to aplastic anemia, agranulocytosis, methemoglobinemia, blood dyscrasias, liver or kidney failure, lung infiltrates or desquamative/blistering drug rashes.

## 2022-07-06 ENCOUNTER — APPOINTMENT (OUTPATIENT)
Dept: NEUROLOGY | Facility: CLINIC | Age: 77
End: 2022-07-06

## 2022-07-06 DIAGNOSIS — I63.512 CEREBRAL INFARCTION DUE TO UNSPECIFIED OCCLUSION OR STENOSIS OF LEFT MIDDLE CEREBRAL ARTERY: ICD-10-CM

## 2022-09-13 ENCOUNTER — APPOINTMENT (OUTPATIENT)
Dept: UROLOGY | Facility: CLINIC | Age: 77
End: 2022-09-13

## 2022-09-13 VITALS
RESPIRATION RATE: 16 BRPM | OXYGEN SATURATION: 97 % | DIASTOLIC BLOOD PRESSURE: 75 MMHG | HEART RATE: 63 BPM | SYSTOLIC BLOOD PRESSURE: 135 MMHG | TEMPERATURE: 97.2 F

## 2022-09-13 PROCEDURE — 99214 OFFICE O/P EST MOD 30 MIN: CPT

## 2022-09-13 NOTE — HISTORY OF PRESENT ILLNESS
[FreeTextEntry1] : 76M who presents for fu regarding his low ejaculatory volume. Has an IPP which is working well.\par Previously used pramipexole for orgasms. \par \par NOw using TRT to improve his levels and see if it helps him with his orgasms. \par \par Axiron 2 pumps daily. Took this morning. \par \par Penile sensitivity has increased since starting TRT. Able to ejaculate but still has low volume. \par \par

## 2022-09-13 NOTE — ASSESSMENT
[FreeTextEntry1] : Ejaculatory dysfunction\par - started Axiron last visit - will refill today - penile sensitivity increased, energy levels improved, good libido\par - pramipexole increased to 0.5 last visit - will refill today\par - Will check testosterone today\par \par \par no side effects \par refills \par continued monitoring of controlled substance \par \par The submitted E/M billing level for this visit reflects the total time spent on the day of the visit including documentation in EMR, face-to-face time spent with the patient, non-face-to-face review of medical records and relevant information, review of laboratory results available via [facility] portal, as well using a patient’s electronic medical records portal for patients with records not available to [facility] directly. \par \par Time spend counseling and performing coordination of care was also included in determining the appropriate EM billing level.\par \par I have reviewed and verified information regarding the chief complaint and history recorded by the ancillary staff and/or the patient. I have independently reviewed and interpreted tests performed by other physicians and facilities as necessary. I have reviewed images pertinent to providing the care to  the patient. \par \par Notes from other physicians were reviewed. \par \par \par I have reviewed with the patient previously ordered laboratory tests, discussed changes in levels, their meaning and consequences.\par \par I have discussed with the patient differential diagnosis, reason for auxiliary tests if ordered, risks, benefits, alternatives, and complications of each form of therapy included surgical therapy. Off label use of most of medications used in andrology was reviewed. \par \par Additional labs and imaging studies were ordered. \par \par All questions were answered. \par \par \par time 32 min

## 2022-09-13 NOTE — PHYSICAL EXAM
[General Appearance - Well Developed] : well developed [General Appearance - Well Nourished] : well nourished [Normal Appearance] : normal appearance [Well Groomed] : well groomed [General Appearance - In No Acute Distress] : no acute distress [Abdomen Soft] : soft [Abdomen Tenderness] : non-tender [Costovertebral Angle Tenderness] : no ~M costovertebral angle tenderness [Urethral Meatus] : meatus normal [Urinary Bladder Findings] : the bladder was normal on palpation [Scrotum] : the scrotum was normal [Testes Mass (___cm)] : there were no testicular masses [Edema] : no peripheral edema [] : no respiratory distress [Respiration, Rhythm And Depth] : normal respiratory rhythm and effort [Exaggerated Use Of Accessory Muscles For Inspiration] : no accessory muscle use [Oriented To Time, Place, And Person] : oriented to person, place, and time [Affect] : the affect was normal [Mood] : the mood was normal [Not Anxious] : not anxious [Normal Station and Gait] : the gait and station were normal for the patient's age [No Focal Deficits] : no focal deficits [Penis Abnormality] : normal circumcised penis [FreeTextEntry1] : Penile prosthesis in adequate position, functional

## 2022-09-19 LAB
TESTOST FREE SERPL-MCNC: 13 PG/ML
TESTOST SERPL-MCNC: 578 NG/DL

## 2022-11-29 ENCOUNTER — APPOINTMENT (OUTPATIENT)
Dept: ORTHOPEDIC SURGERY | Facility: CLINIC | Age: 77
End: 2022-11-29

## 2023-03-02 ENCOUNTER — APPOINTMENT (OUTPATIENT)
Dept: SURGERY | Facility: CLINIC | Age: 78
End: 2023-03-02
Payer: MEDICARE

## 2023-03-02 VITALS
TEMPERATURE: 98.1 F | WEIGHT: 215 LBS | SYSTOLIC BLOOD PRESSURE: 166 MMHG | HEIGHT: 72 IN | HEART RATE: 65 BPM | DIASTOLIC BLOOD PRESSURE: 80 MMHG | BODY MASS INDEX: 29.12 KG/M2 | OXYGEN SATURATION: 96 %

## 2023-03-02 DIAGNOSIS — M62.08 SEPARATION OF MUSCLE (NONTRAUMATIC), OTHER SITE: ICD-10-CM

## 2023-03-02 PROCEDURE — 99214 OFFICE O/P EST MOD 30 MIN: CPT

## 2023-03-07 ENCOUNTER — APPOINTMENT (OUTPATIENT)
Dept: UROLOGY | Facility: CLINIC | Age: 78
End: 2023-03-07

## 2023-04-14 ENCOUNTER — APPOINTMENT (OUTPATIENT)
Dept: UROLOGY | Facility: CLINIC | Age: 78
End: 2023-04-14
Payer: MEDICARE

## 2023-04-14 VITALS
HEART RATE: 63 BPM | DIASTOLIC BLOOD PRESSURE: 82 MMHG | OXYGEN SATURATION: 95 % | SYSTOLIC BLOOD PRESSURE: 188 MMHG | RESPIRATION RATE: 16 BRPM | TEMPERATURE: 97.8 F

## 2023-04-14 DIAGNOSIS — F52.32 MALE ORGASMIC DISORDER: ICD-10-CM

## 2023-04-14 PROCEDURE — 36415 COLL VENOUS BLD VENIPUNCTURE: CPT

## 2023-04-14 PROCEDURE — 99214 OFFICE O/P EST MOD 30 MIN: CPT | Mod: 25

## 2023-04-14 NOTE — HISTORY OF PRESENT ILLNESS
[FreeTextEntry1] : ADRIEN SAWANT, a 77 year old male, presented to the office for a follow up regarding his low testosterone and low ejaculate.\par \par Using Axiron 2 pumps and feels better.\par \par Notices improvement in libido, and ejaculation with pramipexole and TRT\par \par IPP placed in November 2020\par -working well; 8 pumps for full erections \par \par

## 2023-04-14 NOTE — PHYSICAL EXAM

## 2023-04-14 NOTE — ASSESSMENT
[FreeTextEntry1] : Low T\par -check labs - cbc, cmp, FRET, lipid - labs drawn in office\par -refill TRT\par \par Anorgasmia\par -refill pramipexole\par \par Patient states he feels so much better since starting the plan of care. \par \par Patient moving to [place] in 3 months. Advised patient to follow up with a urologist in [place] in 6 months.  \par \par This entire visit was conducted in the presence of PIPER Ochoa.\par \par \par

## 2023-04-17 LAB
BASOPHILS # BLD AUTO: 0.1 K/UL
BASOPHILS NFR BLD AUTO: 1.2 %
EOSINOPHIL # BLD AUTO: 0.66 K/UL
EOSINOPHIL NFR BLD AUTO: 8.2 %
HCT VFR BLD CALC: 50.3 %
HGB BLD-MCNC: 15.8 G/DL
IMM GRANULOCYTES NFR BLD AUTO: 0.4 %
LYMPHOCYTES # BLD AUTO: 1.41 K/UL
LYMPHOCYTES NFR BLD AUTO: 17.5 %
MAN DIFF?: NORMAL
MCHC RBC-ENTMCNC: 27.4 PG
MCHC RBC-ENTMCNC: 31.4 GM/DL
MCV RBC AUTO: 87.2 FL
MONOCYTES # BLD AUTO: 0.74 K/UL
MONOCYTES NFR BLD AUTO: 9.2 %
NEUTROPHILS # BLD AUTO: 5.12 K/UL
NEUTROPHILS NFR BLD AUTO: 63.5 %
PLATELET # BLD AUTO: 191 K/UL
RBC # BLD: 5.77 M/UL
RBC # FLD: 16.8 %
WBC # FLD AUTO: 8.06 K/UL

## 2023-04-18 LAB
TESTOST FREE SERPL-MCNC: 14.3 PG/ML
TESTOST SERPL-MCNC: 587 NG/DL

## 2023-05-04 ENCOUNTER — APPOINTMENT (OUTPATIENT)
Dept: UROLOGY | Facility: CLINIC | Age: 78
End: 2023-05-04

## 2023-06-19 ENCOUNTER — APPOINTMENT (OUTPATIENT)
Dept: NEUROLOGY | Facility: CLINIC | Age: 78
End: 2023-06-19

## 2023-07-15 ENCOUNTER — RX RENEWAL (OUTPATIENT)
Age: 78
End: 2023-07-15

## 2023-07-18 ENCOUNTER — APPOINTMENT (OUTPATIENT)
Dept: UROLOGY | Facility: CLINIC | Age: 78
End: 2023-07-18
Payer: MEDICARE

## 2023-07-18 VITALS
OXYGEN SATURATION: 92 % | HEIGHT: 72 IN | BODY MASS INDEX: 29.12 KG/M2 | SYSTOLIC BLOOD PRESSURE: 147 MMHG | WEIGHT: 215 LBS | DIASTOLIC BLOOD PRESSURE: 67 MMHG | HEART RATE: 75 BPM

## 2023-07-18 DIAGNOSIS — N40.1 BENIGN PROSTATIC HYPERPLASIA WITH LOWER URINARY TRACT SYMPMS: ICD-10-CM

## 2023-07-18 DIAGNOSIS — R79.89 OTHER SPECIFIED ABNORMAL FINDINGS OF BLOOD CHEMISTRY: ICD-10-CM

## 2023-07-18 PROCEDURE — 99214 OFFICE O/P EST MOD 30 MIN: CPT

## 2023-07-18 NOTE — ASSESSMENT
[FreeTextEntry1] : 77 y.o. M with:\par \par #ED s/p IPP\par - No issues\par \par #Low T\par - TRT renewed\par - CBC, T, PSA\par - RV 4 months with labs\par \par #Delayed ejaculation\par - Continue pramipexole \par \par #BPH\par - PVR 33 mL\par - Off all alpha blockers. Content with symptoms \par \par

## 2023-07-18 NOTE — PHYSICAL EXAM
[Normal Appearance] : normal appearance [Abdomen Tenderness] : non-tender [Costovertebral Angle Tenderness] : no ~M costovertebral angle tenderness [Urethral Meatus] : meatus normal [] : no rash [Edema] : no peripheral edema [FreeTextEntry1] : IPP cylinders in place. pump palpated.

## 2023-07-18 NOTE — HISTORY OF PRESENT ILLNESS
[FreeTextEntry1] : 77-year-old male presents for follow-up.\par \par #LUTS\par History of urolift\par Last PSA 2.94 ~6 weeks ago\par Nocturia x 2-3. Improved since urolift\par \par #ED\par Previously seen by Dr. Wagner.  [facility]\par \par #Low testosterone\par Also with low testosterone, on testosterone replacement\par Uses topical testosterone - 2 pumps / day\par Last hematocrit April 2023: 50.3\par Last testosterone: 587\par \par #Low ejaculate volume\par On pramipexole\par Able to orgasm but takes long time

## 2023-07-19 DIAGNOSIS — R71.8 OTHER ABNORMALITY OF RED BLOOD CELLS: ICD-10-CM

## 2023-07-19 LAB
APPEARANCE: CLEAR
BACTERIA: NEGATIVE /HPF
BILIRUBIN URINE: NEGATIVE
BLOOD URINE: NEGATIVE
CAST: 0 /LPF
COLOR: YELLOW
EPITHELIAL CELLS: 0 /HPF
GLUCOSE QUALITATIVE U: NEGATIVE MG/DL
KETONES URINE: NEGATIVE MG/DL
LEUKOCYTE ESTERASE URINE: ABNORMAL
MICROSCOPIC-UA: NORMAL
NITRITE URINE: NEGATIVE
PH URINE: 5.5
PROTEIN URINE: NEGATIVE MG/DL
PSA SERPL-MCNC: 2.44 NG/ML
RED BLOOD CELLS URINE: 1 /HPF
SPECIFIC GRAVITY URINE: 1.02
TESTOST SERPL-MCNC: 797 NG/DL
UROBILINOGEN URINE: 0.2 MG/DL
WHITE BLOOD CELLS URINE: 4 /HPF

## 2023-07-21 ENCOUNTER — APPOINTMENT (OUTPATIENT)
Dept: NEUROLOGY | Facility: CLINIC | Age: 78
End: 2023-07-21
Payer: MEDICARE

## 2023-07-21 PROCEDURE — 93880 EXTRACRANIAL BILAT STUDY: CPT

## 2023-07-21 PROCEDURE — 93888 INTRACRANIAL LIMITED STUDY: CPT

## 2023-07-28 ENCOUNTER — NON-APPOINTMENT (OUTPATIENT)
Age: 78
End: 2023-07-28

## 2023-08-30 NOTE — ED CDU PROVIDER SUBSEQUENT DAY NOTE - GASTROINTESTINAL, MLM
Problem: Adult Inpatient Plan of Care  Goal: Plan of Care Review  Outcome: Ongoing, Progressing  Flowsheets (Taken 8/30/2023 0985)  Plan of Care Reviewed With: patient  Goal: Patient-Specific Goal (Individualized)  Outcome: Ongoing, Progressing  Goal: Absence of Hospital-Acquired Illness or Injury  Outcome: Ongoing, Progressing  Goal: Optimal Comfort and Wellbeing  Outcome: Ongoing, Progressing  Goal: Readiness for Transition of Care  Outcome: Ongoing, Progressing     Problem: Pain Acute  Goal: Acceptable Pain Control and Functional Ability  Outcome: Ongoing, Progressing     Problem: Fluid Deficit (Intestinal Obstruction)  Goal: Fluid Balance  Outcome: Ongoing, Progressing     Problem: Nausea and Vomiting (Intestinal Obstruction)  Goal: Nausea and Vomiting Relief  Outcome: Ongoing, Progressing   Goal Outcome Evaluation:  Plan of Care Reviewed With: patient                      Abdomen soft, non-tender, no rebound, no guarding.

## 2023-09-19 LAB
HCT VFR BLD CALC: 52.2 %
HGB BLD-MCNC: 17 G/DL
MCHC RBC-ENTMCNC: 28.7 PG
MCHC RBC-ENTMCNC: 32.6 GM/DL
MCV RBC AUTO: 88 FL
PLATELET # BLD AUTO: 277 K/UL
RBC # BLD: 5.93 M/UL
RBC # FLD: 14.4 %
TESTOST SERPL-MCNC: 920 NG/DL
WBC # FLD AUTO: 11.61 K/UL

## 2023-09-20 LAB — PSA SERPL-MCNC: 2.84 NG/ML

## 2023-10-17 ENCOUNTER — RX RENEWAL (OUTPATIENT)
Age: 78
End: 2023-10-17

## 2023-10-17 RX ORDER — PRAMIPEXOLE DIHYDROCHLORIDE 0.5 MG/1
0.5 TABLET ORAL TWICE DAILY
Qty: 180 | Refills: 0 | Status: ACTIVE | COMMUNITY
Start: 2022-05-03 | End: 1900-01-01

## 2023-10-26 ENCOUNTER — APPOINTMENT (OUTPATIENT)
Dept: UROLOGY | Facility: CLINIC | Age: 78
End: 2023-10-26
Payer: MEDICARE

## 2023-10-26 PROCEDURE — 99215 OFFICE O/P EST HI 40 MIN: CPT | Mod: 95

## 2023-10-26 RX ORDER — TAMSULOSIN HYDROCHLORIDE 0.4 MG/1
0.4 CAPSULE ORAL
Qty: 90 | Refills: 0 | Status: DISCONTINUED | COMMUNITY
Start: 2021-06-06 | End: 2023-04-01

## 2023-11-05 LAB
25(OH)D3 SERPL-MCNC: 25.9 NG/ML
ALBUMIN SERPL ELPH-MCNC: 4.6 G/DL
ALP BLD-CCNC: 61 U/L
ALT SERPL-CCNC: 32 U/L
ANION GAP SERPL CALC-SCNC: 12 MMOL/L
APPEARANCE: CLEAR
AST SERPL-CCNC: 24 U/L
BILIRUB SERPL-MCNC: 0.5 MG/DL
BILIRUBIN URINE: NEGATIVE
BLOOD URINE: NEGATIVE
BUN SERPL-MCNC: 19 MG/DL
CALCIUM SERPL-MCNC: 10 MG/DL
CHLORIDE SERPL-SCNC: 101 MMOL/L
CHOLEST SERPL-MCNC: 170 MG/DL
CO2 SERPL-SCNC: 26 MMOL/L
COLOR: YELLOW
CREAT SERPL-MCNC: 0.93 MG/DL
CRP SERPL HS-MCNC: 6.67 MG/L
EGFR: 86 ML/MIN/1.73M2
ESTIMATED AVERAGE GLUCOSE: 120 MG/DL
ESTRADIOL SERPL-MCNC: 18 PG/ML
GLUCOSE QUALITATIVE U: NEGATIVE MG/DL
GLUCOSE SERPL-MCNC: 100 MG/DL
HBA1C MFR BLD HPLC: 5.8 %
HCT VFR BLD CALC: 54.6 %
HDLC SERPL-MCNC: 46 MG/DL
HGB BLD-MCNC: 17.2 G/DL
KETONES URINE: NEGATIVE MG/DL
LDLC SERPL CALC-MCNC: 109 MG/DL
LEUKOCYTE ESTERASE URINE: NEGATIVE
LH SERPL-ACNC: 0.6 IU/L
MCHC RBC-ENTMCNC: 27 PG
MCHC RBC-ENTMCNC: 31.5 GM/DL
MCV RBC AUTO: 85.8 FL
NITRITE URINE: NEGATIVE
NONHDLC SERPL-MCNC: 124 MG/DL
PH URINE: 5.5
PLATELET # BLD AUTO: 328 K/UL
POTASSIUM SERPL-SCNC: 4.4 MMOL/L
PROLACTIN SERPL-MCNC: 11.8 NG/ML
PROT SERPL-MCNC: 6.9 G/DL
PROTEIN URINE: NEGATIVE MG/DL
RBC # BLD: 6.36 M/UL
RBC # FLD: 16.5 %
SODIUM SERPL-SCNC: 139 MMOL/L
SPECIFIC GRAVITY URINE: 1.02
TRIGL SERPL-MCNC: 82 MG/DL
TSH SERPL-ACNC: 1.87 UIU/ML
UROBILINOGEN URINE: 0.2 MG/DL
WBC # FLD AUTO: 10.62 K/UL

## 2023-11-08 LAB
TESTOST FREE SERPL-MCNC: 8.2 PG/ML
TESTOST SERPL-MCNC: 318 NG/DL

## 2023-11-28 ENCOUNTER — APPOINTMENT (OUTPATIENT)
Dept: UROLOGY | Facility: CLINIC | Age: 78
End: 2023-11-28

## 2023-12-05 LAB
HCT VFR BLD CALC: 51.8 %
HGB BLD-MCNC: 16.8 G/DL
MCHC RBC-ENTMCNC: 28.3 PG
MCHC RBC-ENTMCNC: 32.4 GM/DL
MCV RBC AUTO: 87.4 FL
PLATELET # BLD AUTO: 237 K/UL
RBC # BLD: 5.93 M/UL
RBC # FLD: 17.8 %
WBC # FLD AUTO: 9.13 K/UL

## 2023-12-07 ENCOUNTER — APPOINTMENT (OUTPATIENT)
Dept: UROLOGY | Facility: CLINIC | Age: 78
End: 2023-12-07

## 2024-02-08 ENCOUNTER — APPOINTMENT (OUTPATIENT)
Dept: UROLOGY | Facility: CLINIC | Age: 79
End: 2024-02-08
Payer: MEDICARE

## 2024-02-08 VITALS
WEIGHT: 213 LBS | BODY MASS INDEX: 28.85 KG/M2 | HEART RATE: 64 BPM | DIASTOLIC BLOOD PRESSURE: 76 MMHG | SYSTOLIC BLOOD PRESSURE: 175 MMHG | HEIGHT: 72 IN

## 2024-02-08 PROCEDURE — G2211 COMPLEX E/M VISIT ADD ON: CPT

## 2024-02-08 PROCEDURE — 99214 OFFICE O/P EST MOD 30 MIN: CPT

## 2024-02-08 NOTE — ASSESSMENT
[FreeTextEntry1] : The patient returns for follow-up to review his recent blood studies and discuss options for therapy.  He also returns for a physical examination.Laboratory test demonstrated hematocrit of 51.8%, estradiol 18 pg/mL, LH 0.6 IU/L, vitamin D 25 was low at 25.9 ng/mL and supplementation was discussed.  Prolactin 11.8 ng/mL, TSH 1.87 IU/mL, T free was 8.2 pg/ml and total T of 318 ng/dl.  Hr is on topical testosterone had a penile implant in 11/2020 and urolift in 2019. He has decreasing force of ejaculation. Dr. Horton is following his prostate and prescribing testosterone. His primary concern is about his ejacu;ation which might be due to retrograde ejaculation or a effect of his antihypertensive medications. We discussed options and testing.  Consultation: 30 minutes:  10 minutes reviewing his history and performing a physical examination.  20 minutes reviewing the ultrasound, discussing treatment options and writing his note. There was also additional time in preparation for today's visit.

## 2024-02-08 NOTE — HISTORY OF PRESENT ILLNESS
[FreeTextEntry1] : The patient returns for follow-up to review his recent blood studies and discuss options for therapy.  He also returns for a physical examination.  PMH: Patient is a 77-year-old gentleman who presents with a chief complaint of low testosterone and erectile dysfunction. He states that this has been present for the past 2 years. It causes both he and his partner great stress. November 2020 IPP Dr. Castellano. Axiron 2 pumps every morning. He donated blood in [email or web address] His Testosteroen has been in the 900s.   I reviewed the questionnaire he completed in detail. His erections are not modified with the degree of sexual stimulation.   He states that his erections presently are often less than 5 out of 10 in both tumescence and rigidity, insufficient for penetration.   He often ejaculates through a flaccid phallus.  He has difficulty maintaining an erection. He describes a normal libido.  His sexual dysfunction occurs with both sexual relations and masturbation.  His erections are not improved with PDE5 inhibitors.    His partner is understanding and was unable to be with him at the visit today. He is not  and has adult children.    The patient denies fevers, chills, nausea and/or vomiting and no unexplained weight loss.  His past medical history is non-contributory.  In his present occupation he has no known toxin exposure. He does not smoke and drinks socially.  He has no known drug allergies. His review of systems and past medical history demonstrates no significant urologic issues (see patient completed questionnaire). His family history demonstrates no significant urologic issues.

## 2024-02-28 ENCOUNTER — APPOINTMENT (OUTPATIENT)
Dept: UROLOGY | Facility: CLINIC | Age: 79
End: 2024-02-28

## 2024-03-20 ENCOUNTER — EMERGENCY (EMERGENCY)
Facility: HOSPITAL | Age: 79
LOS: 1 days | Discharge: HOME | End: 2024-03-20
Attending: STUDENT IN AN ORGANIZED HEALTH CARE EDUCATION/TRAINING PROGRAM | Admitting: EMERGENCY MEDICINE
Payer: MEDICARE

## 2024-03-20 VITALS
TEMPERATURE: 98 F | WEIGHT: 216.05 LBS | RESPIRATION RATE: 16 BRPM | DIASTOLIC BLOOD PRESSURE: 82 MMHG | HEART RATE: 57 BPM | OXYGEN SATURATION: 95 % | SYSTOLIC BLOOD PRESSURE: 193 MMHG | HEIGHT: 78 IN

## 2024-03-20 VITALS
HEART RATE: 50 BPM | OXYGEN SATURATION: 95 % | DIASTOLIC BLOOD PRESSURE: 63 MMHG | RESPIRATION RATE: 17 BRPM | SYSTOLIC BLOOD PRESSURE: 138 MMHG | TEMPERATURE: 98 F

## 2024-03-20 DIAGNOSIS — Z98.890 OTHER SPECIFIED POSTPROCEDURAL STATES: Chronic | ICD-10-CM

## 2024-03-20 DIAGNOSIS — L05.91 PILONIDAL CYST WITHOUT ABSCESS: Chronic | ICD-10-CM

## 2024-03-20 DIAGNOSIS — Z95.9 PRESENCE OF CARDIAC AND VASCULAR IMPLANT AND GRAFT, UNSPECIFIED: Chronic | ICD-10-CM

## 2024-03-20 DIAGNOSIS — Z90.6 ACQUIRED ABSENCE OF OTHER PARTS OF URINARY TRACT: Chronic | ICD-10-CM

## 2024-03-20 DIAGNOSIS — Z96.0 PRESENCE OF UROGENITAL IMPLANTS: Chronic | ICD-10-CM

## 2024-03-20 LAB
ALBUMIN SERPL ELPH-MCNC: 3.8 G/DL — SIGNIFICANT CHANGE UP (ref 3.3–5)
ALP SERPL-CCNC: 49 U/L — SIGNIFICANT CHANGE UP (ref 40–120)
ALT FLD-CCNC: 47 U/L — SIGNIFICANT CHANGE UP (ref 12–78)
ANION GAP SERPL CALC-SCNC: 6 MMOL/L — SIGNIFICANT CHANGE UP (ref 5–17)
APTT BLD: 39.3 SEC — HIGH (ref 24.5–35.6)
AST SERPL-CCNC: 23 U/L — SIGNIFICANT CHANGE UP (ref 15–37)
BASOPHILS # BLD AUTO: 0.11 K/UL — SIGNIFICANT CHANGE UP (ref 0–0.2)
BASOPHILS NFR BLD AUTO: 1.2 % — SIGNIFICANT CHANGE UP (ref 0–2)
BILIRUB SERPL-MCNC: 0.8 MG/DL — SIGNIFICANT CHANGE UP (ref 0.2–1.2)
BUN SERPL-MCNC: 28 MG/DL — HIGH (ref 7–23)
CALCIUM SERPL-MCNC: 9.5 MG/DL — SIGNIFICANT CHANGE UP (ref 8.5–10.1)
CHLORIDE SERPL-SCNC: 110 MMOL/L — HIGH (ref 96–108)
CK MB BLD-MCNC: 3.1 % — SIGNIFICANT CHANGE UP (ref 0–3.5)
CK MB CFR SERPL CALC: 7.3 NG/ML — HIGH (ref 0–3.6)
CK SERPL-CCNC: 234 U/L — SIGNIFICANT CHANGE UP (ref 26–308)
CO2 SERPL-SCNC: 29 MMOL/L — SIGNIFICANT CHANGE UP (ref 22–31)
CREAT SERPL-MCNC: 1.1 MG/DL — SIGNIFICANT CHANGE UP (ref 0.5–1.3)
EGFR: 70 ML/MIN/1.73M2 — SIGNIFICANT CHANGE UP
EOSINOPHIL # BLD AUTO: 0.6 K/UL — HIGH (ref 0–0.5)
EOSINOPHIL NFR BLD AUTO: 6.7 % — HIGH (ref 0–6)
GLUCOSE SERPL-MCNC: 95 MG/DL — SIGNIFICANT CHANGE UP (ref 70–99)
HCT VFR BLD CALC: 52.7 % — HIGH (ref 39–50)
HGB BLD-MCNC: 17 G/DL — SIGNIFICANT CHANGE UP (ref 13–17)
IMM GRANULOCYTES NFR BLD AUTO: 0.3 % — SIGNIFICANT CHANGE UP (ref 0–0.9)
INR BLD: 0.95 RATIO — SIGNIFICANT CHANGE UP (ref 0.85–1.18)
LIDOCAIN IGE QN: 29 U/L — SIGNIFICANT CHANGE UP (ref 13–75)
LYMPHOCYTES # BLD AUTO: 2.05 K/UL — SIGNIFICANT CHANGE UP (ref 1–3.3)
LYMPHOCYTES # BLD AUTO: 23 % — SIGNIFICANT CHANGE UP (ref 13–44)
MAGNESIUM SERPL-MCNC: 2.3 MG/DL — SIGNIFICANT CHANGE UP (ref 1.6–2.6)
MCHC RBC-ENTMCNC: 28.2 PG — SIGNIFICANT CHANGE UP (ref 27–34)
MCHC RBC-ENTMCNC: 32.3 GM/DL — SIGNIFICANT CHANGE UP (ref 32–36)
MCV RBC AUTO: 87.4 FL — SIGNIFICANT CHANGE UP (ref 80–100)
MONOCYTES # BLD AUTO: 0.68 K/UL — SIGNIFICANT CHANGE UP (ref 0–0.9)
MONOCYTES NFR BLD AUTO: 7.6 % — SIGNIFICANT CHANGE UP (ref 2–14)
NEUTROPHILS # BLD AUTO: 5.45 K/UL — SIGNIFICANT CHANGE UP (ref 1.8–7.4)
NEUTROPHILS NFR BLD AUTO: 61.2 % — SIGNIFICANT CHANGE UP (ref 43–77)
NRBC # BLD: 0 /100 WBCS — SIGNIFICANT CHANGE UP (ref 0–0)
NT-PROBNP SERPL-SCNC: 49 PG/ML — SIGNIFICANT CHANGE UP (ref 0–450)
PLATELET # BLD AUTO: 247 K/UL — SIGNIFICANT CHANGE UP (ref 150–400)
POTASSIUM SERPL-MCNC: 3.9 MMOL/L — SIGNIFICANT CHANGE UP (ref 3.5–5.3)
POTASSIUM SERPL-SCNC: 3.9 MMOL/L — SIGNIFICANT CHANGE UP (ref 3.5–5.3)
PROT SERPL-MCNC: 7.4 G/DL — SIGNIFICANT CHANGE UP (ref 6–8.3)
PROTHROM AB SERPL-ACNC: 11.1 SEC — SIGNIFICANT CHANGE UP (ref 9.5–13)
RBC # BLD: 6.03 M/UL — HIGH (ref 4.2–5.8)
RBC # FLD: 14.6 % — HIGH (ref 10.3–14.5)
SODIUM SERPL-SCNC: 145 MMOL/L — SIGNIFICANT CHANGE UP (ref 135–145)
TROPONIN I, HIGH SENSITIVITY RESULT: 7.9 NG/L — SIGNIFICANT CHANGE UP
WBC # BLD: 8.92 K/UL — SIGNIFICANT CHANGE UP (ref 3.8–10.5)
WBC # FLD AUTO: 8.92 K/UL — SIGNIFICANT CHANGE UP (ref 3.8–10.5)

## 2024-03-20 PROCEDURE — 84484 ASSAY OF TROPONIN QUANT: CPT

## 2024-03-20 PROCEDURE — 96375 TX/PRO/DX INJ NEW DRUG ADDON: CPT

## 2024-03-20 PROCEDURE — 85610 PROTHROMBIN TIME: CPT

## 2024-03-20 PROCEDURE — 85730 THROMBOPLASTIN TIME PARTIAL: CPT

## 2024-03-20 PROCEDURE — 71045 X-RAY EXAM CHEST 1 VIEW: CPT

## 2024-03-20 PROCEDURE — 99285 EMERGENCY DEPT VISIT HI MDM: CPT

## 2024-03-20 PROCEDURE — 99285 EMERGENCY DEPT VISIT HI MDM: CPT | Mod: 25

## 2024-03-20 PROCEDURE — 83735 ASSAY OF MAGNESIUM: CPT

## 2024-03-20 PROCEDURE — 70450 CT HEAD/BRAIN W/O DYE: CPT | Mod: 26,MC

## 2024-03-20 PROCEDURE — 96374 THER/PROPH/DIAG INJ IV PUSH: CPT

## 2024-03-20 PROCEDURE — 82550 ASSAY OF CK (CPK): CPT

## 2024-03-20 PROCEDURE — 71045 X-RAY EXAM CHEST 1 VIEW: CPT | Mod: 26

## 2024-03-20 PROCEDURE — 82553 CREATINE MB FRACTION: CPT

## 2024-03-20 PROCEDURE — 80053 COMPREHEN METABOLIC PANEL: CPT

## 2024-03-20 PROCEDURE — 93005 ELECTROCARDIOGRAM TRACING: CPT

## 2024-03-20 PROCEDURE — 83880 ASSAY OF NATRIURETIC PEPTIDE: CPT

## 2024-03-20 PROCEDURE — 83690 ASSAY OF LIPASE: CPT

## 2024-03-20 PROCEDURE — 85025 COMPLETE CBC W/AUTO DIFF WBC: CPT

## 2024-03-20 PROCEDURE — 70450 CT HEAD/BRAIN W/O DYE: CPT | Mod: MC

## 2024-03-20 PROCEDURE — 36415 COLL VENOUS BLD VENIPUNCTURE: CPT

## 2024-03-20 PROCEDURE — 93010 ELECTROCARDIOGRAM REPORT: CPT

## 2024-03-20 RX ORDER — ROSUVASTATIN CALCIUM 5 MG/1
1 TABLET ORAL
Qty: 0 | Refills: 0 | DISCHARGE

## 2024-03-20 RX ORDER — HYDRALAZINE HCL 50 MG
1 TABLET ORAL
Qty: 60 | Refills: 0
Start: 2024-03-20 | End: 2024-04-18

## 2024-03-20 RX ORDER — MULTIVIT-MIN/FERROUS GLUCONATE 9 MG/15 ML
1 LIQUID (ML) ORAL
Qty: 0 | Refills: 0 | DISCHARGE

## 2024-03-20 RX ORDER — AMLODIPINE BESYLATE AND OLMESARTRAN MEDOXOMIL 10; 40 MG/1; MG/1
1 TABLET, FILM COATED ORAL
Qty: 0 | Refills: 0 | DISCHARGE

## 2024-03-20 RX ORDER — ACETAMINOPHEN 500 MG
1000 TABLET ORAL ONCE
Refills: 0 | Status: COMPLETED | OUTPATIENT
Start: 2024-03-20 | End: 2024-03-20

## 2024-03-20 RX ORDER — ALFUZOSIN HYDROCHLORIDE 10 MG/1
1 TABLET, EXTENDED RELEASE ORAL
Qty: 0 | Refills: 0 | DISCHARGE

## 2024-03-20 RX ORDER — LABETALOL HCL 100 MG
1 TABLET ORAL
Qty: 0 | Refills: 0 | DISCHARGE

## 2024-03-20 RX ORDER — HYDRALAZINE HCL 50 MG
10 TABLET ORAL ONCE
Refills: 0 | Status: COMPLETED | OUTPATIENT
Start: 2024-03-20 | End: 2024-03-20

## 2024-03-20 RX ORDER — NEBIVOLOL HYDROCHLORIDE 5 MG/1
1 TABLET ORAL
Refills: 0 | DISCHARGE

## 2024-03-20 RX ADMIN — Medication 10 MILLIGRAM(S): at 03:38

## 2024-03-20 RX ADMIN — Medication 400 MILLIGRAM(S): at 05:13

## 2024-03-20 NOTE — ED ADULT NURSE NOTE - OBJECTIVE STATEMENT
pt presents to the ER because of ongoing hypertension while at home . pt states he takes amlodipine/ labetalol. pt states he contacted md concerning the Labetalol "drying me out". Md told pt to d/c Labetalol on Wednesday and pt began on Nebivolol. pt states that his BP has been elevated with no relief since.  pt states he has had a headache X6 days. Pt is Zain on monitor. pt states he last took Nebivolol @130am

## 2024-03-20 NOTE — ED PROVIDER NOTE - PATIENT PORTAL LINK FT
You can access the FollowMyHealth Patient Portal offered by [facility] by registering at the following website: [email or web address]. By joining [facility]’s FollowMyHealth portal, you will also be able to view your health information using other applications (apps) compatible with our system.

## 2024-03-20 NOTE — ED PROVIDER NOTE - CLINICAL SUMMARY MEDICAL DECISION MAKING FREE TEXT BOX
78 year old male with a history of HTN p/w elevated BP and decreased HR since medication change 1 week ago.  No focal findings on exam.  HR 41-55 in ED with elevated BP.  Check labs, CE, EKG, CXR, CT head, anti-hypertensive, cardiology consult

## 2024-03-20 NOTE — ED ADULT NURSE NOTE - NSSUHOSCREENINGYN_ED_ALL_ED
amLODIPine (NORVASC) 10 MG tablet AND AMLODIPINE ATORVASTATIN WAS BOTH SENT IN YESTERDAY.  WHICH ONE?    PLEASE CALL [phone]     Yes - the patient is able to be screened

## 2024-03-20 NOTE — ED PROVIDER NOTE - DIFFERENTIAL DIAGNOSIS
Ddx includes but not limited to hypertensive urgency/emergency, CVA, migraine, ACS, MI, ICH Differential Diagnosis

## 2024-03-20 NOTE — CONSULT NOTE ADULT - SUBJECTIVE AND OBJECTIVE BOX
Patient is a 78y old  Male who presents with a chief complaint of     HPI: Pt is a 77 y/o M with PMHx of HTN, CVA in 2019, CAD s/p 1 stent, HLD, diverticulitis who presents with headache and HTN. Pt reports that last week he spoke with his cardiologist Dr. Redding about changing up his medications due to side effects. Pt had been experiencing SOB at night that was keeping him awake. Pt had to push on his diaphragm in order to help him breath. Pt expressed this to his cardiologist who subsequently changed his labetalol 200mg BID to bystolic 20mg BID. Pt also takes amlodipine-olmesartan 5-40 BID. Pt noticed after the switch that he blood pressure began going up and his heart rate went down. He has also had frontal headaches x1 week. AM BPs range from 160s-180s, pt still c/o SOB. He denies other symptoms including dizziness, lightheadedness, chest pain, back pain, palpitations, AP, n/v. BP on arrival was 193/82 which improved to 126/63 after receiving hydralazine 10mg IV x1. He also reports some improvement in his headache. No other concerns at this time.      PAST MEDICAL & SURGICAL HISTORY:  Coronary artery disease  stent placed 2014      Carpal tunnel syndrome, right      Hypertension      Hiatal hernia with GERD      Dyslipidemia      Cerebrovascular accident (CVA)  2019      Arthritis      Diverticulitis      Ventral hernia      S/P angioplasty with stent  2014      Pilonidal cyst      History of penile implant      H/O carotid endarterectomy  left   May 2019      H/O total cystectomy  2021    scrotum                ECHO  FINDINGS:      MEDICATIONS  (STANDING):    MEDICATIONS  (PRN):      FAMILY HISTORY:  Family history of CABG (Father)    Family history of cerebrovascular accident (CVA) (Father)    Family history of coronary artery disease (Father)      Denies Family history of CAD or early MI    ROS:  Constitutional: denies fever, chills  HEENT: denies blurry vision, difficulty hearing  Respiratory: denies SOB, ZAMORANO, cough  Cardiovascular: denies CP, palpitations, orthopnea, PND, LE edema  Gastrointestinal: denies nausea, vomiting, abdominal pain  Neurologic: + headache, denies weakness, dizziness      SOCIAL HISTORY:    No tobacco, Alcohol or Ddrug use    Vital Signs Last 24 Hrs  T(C): 36.5 (20 Mar 2024 11:17), Max: 36.6 (20 Mar 2024 04:30)  T(F): 97.7 (20 Mar 2024 11:17), Max: 97.9 (20 Mar 2024 04:30)  HR: 50 (20 Mar 2024 11:17) (48 - 57)  BP: 138/63 (20 Mar 2024 11:17) (123/67 - 193/82)  BP(mean): --  RR: 17 (20 Mar 2024 11:17) (16 - 18)  SpO2: 95% (20 Mar 2024 11:17) (94% - 95%)    Parameters below as of 20 Mar 2024 04:30  Patient On (Oxygen Delivery Method): room air        Physical Exam:  General: Well developed, well nourished, NAD  HEENT: NCAT, EOMI  Neurology: answers questions and follows commands appropriately   Respiratory: CTA B/L, No W/R/R  CV: RRR, +S1/S2, no murmurs, rubs or gallops  Abdominal: Soft, non-tender  Extremities: No C/C/E      ECG:    I&O's Detail      LABS:                        17.0   8.92  )-----------( 247      ( 20 Mar 2024 03:25 )             52.7     03-20    145  |  110<H>  |  28<H>  ----------------------------<  95  3.9   |  29  |  1.10    Ca    9.5      20 Mar 2024 03:25  Mg     2.3     03-20    TPro  7.4  /  Alb  3.8  /  TBili  0.8  /  DBili  x   /  AST  23  /  ALT  47  /  AlkPhos  49  03-20    CARDIAC MARKERS ( 20 Mar 2024 03:25 )  x     / x     / 234 U/L / x     / 7.3 ng/mL      PT/INR - ( 20 Mar 2024 03:25 )   PT: 11.1 sec;   INR: 0.95 ratio         PTT - ( 20 Mar 2024 03:25 )  PTT:39.3 sec  Urinalysis Basic - ( 20 Mar 2024 03:25 )    Color: x / Appearance: x / SG: x / pH: x  Gluc: 95 mg/dL / Ketone: x  / Bili: x / Urobili: x   Blood: x / Protein: x / Nitrite: x   Leuk Esterase: x / RBC: x / WBC x   Sq Epi: x / Non Sq Epi: x / Bacteria: x      I&O's Summary    BNP  RADIOLOGY & ADDITIONAL STUDIES:

## 2024-03-20 NOTE — ED PROVIDER NOTE - NSFOLLOWUPINSTRUCTIONS_ED_ALL_ED_FT
-- You should update your primary care physician on your Emergency Department visit and follow up with them.  If you do not have a physician or have difficulty following up, please call: [phone] to obtain a [facility] doctor or specialist who can provide follow up.    -- Start Hydralazine 25mg twice a day.    -- Return to the ER for worsening or persistent symptoms, and/or ANY NEW OR CONCERNING SYMPTOMS.

## 2024-03-20 NOTE — CONSULT NOTE ADULT - NS ATTEND AMEND GEN_ALL_CORE FT
79 y/o M with PMHx of HTN, CVA in 2019, CAD s/p 1 stent, HLD, diverticulitis who presents with headache and HTN.    responded well to hydral IV  dec bystolic to 10mg Qday for lali  cont home norvasc olmesartan. maybe taking too much olmesartan based on script. would address with outpt cards.   add hydral 25mg q12    No signs of significant ischemia or volume overload.   can fu with outpt cards

## 2024-03-20 NOTE — ED ADULT NURSE NOTE - NSFALLHARMRISKINTERV_ED_ALL_ED

## 2024-03-20 NOTE — ED PROVIDER NOTE - PROGRESS NOTE DETAILS
Seen by Dr. Oviedo of cardiology, cleared for discharge.  Adding hydralazine every 12 to her blood pressure regiment.

## 2024-03-20 NOTE — CONSULT NOTE ADULT - ASSESSMENT
Pt is a 79 y/o M with PMHx of HTN, CVA in 2019, CAD s/p 1 stent, HLD, diverticulitis who presents with headache and HTN.    #HTN  - Pt with hx HTN with recent medication regimen change now presenting with increasing BP and lower HRs  - BP on arrival 193/82 -> 126/63 s/p hydralazine 10mg IV x1  - EKG sinus bradycardia with 1st degree AV block and incomplete RBBB  - Trop negative on arrival  - Given recent change from labetalol 200mg BID -> nebivolol 20mg BID, BP elevation likely multifactorial with less alpha blockade as well as stronger beta blockade causing lower HR  - Recommend decreasing nebivolol to 10mg BID  - Add hydralazine 25mg PO BID    - Pt with established appt with his cardiologist Dr. Redding this friday 3/22    - Pt stable for discharge from cardiac standpoint with close outpatient cardiology follow up for medication management    - Other cardiovascular workup will depend on clinical course.  - All other workup per primary team.

## 2024-03-20 NOTE — ED PROVIDER NOTE - OBJECTIVE STATEMENT
78-year-old male with a history of HTN, CVA in 2019, HLD, CAD with 1 stent, diverticulitis presents with headache and hypertension.  Patient states he called his cardiologist at [facility] last week.  He informed his cardiologist that he felt his anti-hypertensive medication was keeping him awake at night.  His cardiologist advised him to discontinue his labetalol, continue the amlodipine-benazepril, and started the patient on Bystolic 20 mg daily.  Since starting the new regimen of blood pressure medication patient states that his blood pressure has been steadily increasing and it is associated with a constant frontal headache x 1 week.  He has been taking Tylenol with no relief.  In the morning his systolic blood pressures range from 166-185.  He denies any associated blurry vision, chest pain, shortness of breath. PMD Dr. Ignacio, Cardiology Dr. Redding 78-year-old male with a history of HTN, CVA in 2019, HLD, CAD with 1 stent, diverticulitis presents with headache and hypertension.  Patient states he called his cardiologist at [facility] last week.  He informed his cardiologist that he felt his anti-hypertensive medication was keeping him awake at night and giving him shortness of breath.  His cardiologist advised him to discontinue his labetalol, continue the amlodipine-olmesartan, and started the patient on Bystolic 20 mg daily.  Since starting the new regimen of blood pressure medication, patient states that his blood pressure has been steadily increasing and it is associated with a constant frontal headache x 1 week.  He has been taking Tylenol with no relief.  In the morning his systolic blood pressures range from 166-185. He also noted HR frequently below 60 since the changhe.  He denies any associated blurry vision, chest pain, shortness of breath. PMD Dr. Ignacio, Cardiology Dr. Redding

## 2024-03-20 NOTE — ED PROVIDER NOTE - CROS ED CARDIOVAS ALL NEG
OVERNIGHT EVENTS: pain controlled, post op h/h: 14.5/43.9, encouraged pt to keep track of po intake ambulate and use IS, wearing SCDs  3/25: s/p laparoscopic sleeve gastrectomy , POC WNL, passed TOV    STATUS POST:  Laparoscopic sleeve gastrectomy     POST OPERATIVE DAY #: 1    SUBJECTIVE: Patient examined bedside with . Patient  reports he is tolerating diet and pain well controlled. Patient denies chest pain, SOB and vomiting.  Patient making adequate urine.        Vital Signs Last 24 Hrs  T(C): 36.7 (26 Mar 2019 05:27), Max: 37.6 (25 Mar 2019 21:25)  T(F): 98.1 (26 Mar 2019 05:27), Max: 99.7 (25 Mar 2019 21:25)  HR: 77 (26 Mar 2019 05:27) (74 - 106)  BP: 107/64 (26 Mar 2019 05:27) (107/64 - 150/80)  BP(mean): 100 (25 Mar 2019 14:52) (94 - 113)  RR: 17 (26 Mar 2019 05:27) (12 - 21)  SpO2: 96% (26 Mar 2019 05:27) (96% - 100%)  I&O's Detail    25 Mar 2019 07:01  -  26 Mar 2019 07:00  --------------------------------------------------------  IN:    lactated ringers.: 2400 mL    Oral Fluid: 140 mL  Total IN: 2540 mL    OUT:    Voided: 1300 mL  Total OUT: 1300 mL    Total NET: 1240 mL          General: NAD, resting comfortably in bed  C/V: NSR  Pulm: Nonlabored breathing, no respiratory distress  Abd:  Soft, non- distended, TTP around incision site, incision clean dry and intact   Extrem: WWP, no edema, SCDs in place      LABS:                        14.5   11.06 )-----------( 216      ( 25 Mar 2019 19:21 )             43.9                 RADIOLOGY & ADDITIONAL STUDIES: negative...

## 2024-03-20 NOTE — ED PROVIDER NOTE - CARE PROVIDER_API CALL
Alphonse Redding  Cardiovascular Disease  [address] [place] [id number]  Phone: [phone]  Fax: [phone]  Follow Up Time:

## 2024-04-15 ENCOUNTER — APPOINTMENT (OUTPATIENT)
Dept: NEUROLOGY | Facility: CLINIC | Age: 79
End: 2024-04-15
Payer: MEDICARE

## 2024-04-15 PROCEDURE — 93880 EXTRACRANIAL BILAT STUDY: CPT

## 2024-04-15 PROCEDURE — 93888 INTRACRANIAL LIMITED STUDY: CPT

## 2024-04-16 ENCOUNTER — APPOINTMENT (OUTPATIENT)
Dept: SURGERY | Facility: CLINIC | Age: 79
End: 2024-04-16
Payer: MEDICARE

## 2024-04-16 VITALS
HEIGHT: 72 IN | HEART RATE: 53 BPM | BODY MASS INDEX: 27.9 KG/M2 | SYSTOLIC BLOOD PRESSURE: 155 MMHG | DIASTOLIC BLOOD PRESSURE: 71 MMHG | OXYGEN SATURATION: 96 % | TEMPERATURE: 99 F | WEIGHT: 206 LBS | RESPIRATION RATE: 16 BRPM

## 2024-04-16 DIAGNOSIS — K21.9 GASTRO-ESOPHAGEAL REFLUX DISEASE W/OUT ESOPHAGITIS: ICD-10-CM

## 2024-04-16 PROCEDURE — 99214 OFFICE O/P EST MOD 30 MIN: CPT

## 2024-04-18 ENCOUNTER — APPOINTMENT (OUTPATIENT)
Dept: UROLOGY | Facility: CLINIC | Age: 79
End: 2024-04-18
Payer: MEDICARE

## 2024-04-18 DIAGNOSIS — N52.9 MALE ERECTILE DYSFUNCTION, UNSPECIFIED: ICD-10-CM

## 2024-04-18 DIAGNOSIS — E34.9 ENDOCRINE DISORDER, UNSPECIFIED: ICD-10-CM

## 2024-04-18 DIAGNOSIS — R31.0 GROSS HEMATURIA: ICD-10-CM

## 2024-04-18 LAB
HCT VFR BLD CALC: 49.2 %
HGB BLD-MCNC: 15.7 G/DL
MCHC RBC-ENTMCNC: 28.2 PG
MCHC RBC-ENTMCNC: 31.9 GM/DL
MCV RBC AUTO: 88.3 FL
PLATELET # BLD AUTO: 254 K/UL
PSA SERPL-MCNC: 2.17 NG/ML
RBC # BLD: 5.57 M/UL
RBC # FLD: 15.3 %
TESTOST SERPL-MCNC: 464 NG/DL
WBC # FLD AUTO: 6.98 K/UL

## 2024-04-18 PROCEDURE — 99214 OFFICE O/P EST MOD 30 MIN: CPT

## 2024-04-18 RX ORDER — TESTOSTERONE 30 MG/1.5ML
30 SOLUTION TOPICAL
Qty: 3 | Refills: 0 | Status: ACTIVE | COMMUNITY
Start: 2022-06-02 | End: 1900-01-01

## 2024-04-18 NOTE — ASSESSMENT
[FreeTextEntry1] : 78 y.o. M with:  #ED s/p IPP - No issues  #Low T - TRT renewed - Labs reviewed - CBC, T, PSA - RV 6 months with labs  #BPH - PVR 20 mL - Off all alpha blockers. Content with symptoms

## 2024-04-18 NOTE — HISTORY OF PRESENT ILLNESS
[FreeTextEntry1] : 77-year-old male presents for follow-up.  #LUTS History of urolift PSA 2.17 4/2024 Nocturia x 2-3. Improved since urolift (was 8 times / night)  #ED Previously seen by Dr. Wagner.  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

## 2024-06-03 PROBLEM — I10 HTN (HYPERTENSION): Status: ACTIVE | Noted: 2019-05-31

## 2024-06-03 PROBLEM — E78.5 HLD (HYPERLIPIDEMIA): Status: ACTIVE | Noted: 2019-05-31

## 2024-06-03 NOTE — PHYSICAL EXAM
[No Acute Distress] : no acute distress [Well Nourished] : well nourished [Well Developed] : well developed [Well-Appearing] : well-appearing [Normal Sclera/Conjunctiva] : normal sclera/conjunctiva [PERRL] : pupils equal round and reactive to light [EOMI] : extraocular movements intact [Normal Oropharynx] : the oropharynx was normal [Normal TMs] : both tympanic membranes were normal [No Lymphadenopathy] : no lymphadenopathy [Supple] : supple [Thyroid Normal, No Nodules] : the thyroid was normal and there were no nodules present [No Respiratory Distress] : no respiratory distress  [Clear to Auscultation] : lungs were clear to auscultation bilaterally [Normal Rate] : normal rate  [Regular Rhythm] : with a regular rhythm [Normal S1, S2] : normal S1 and S2 [No Murmur] : no murmur heard [No Varicosities] : no varicosities [Pedal Pulses Present] : the pedal pulses are present [No Edema] : there was no peripheral edema [No Extremity Clubbing/Cyanosis] : no extremity clubbing/cyanosis [Soft] : abdomen soft [Non Tender] : non-tender [Non-distended] : non-distended [Normal Bowel Sounds] : normal bowel sounds [Normal Posterior Cervical Nodes] : no posterior cervical lymphadenopathy [Normal Anterior Cervical Nodes] : no anterior cervical lymphadenopathy [No CVA Tenderness] : no CVA  tenderness [No Spinal Tenderness] : no spinal tenderness [No Joint Swelling] : no joint swelling [Grossly Normal Strength/Tone] : grossly normal strength/tone [No Rash] : no rash [Coordination Grossly Intact] : coordination grossly intact [No Focal Deficits] : no focal deficits [Normal Gait] : normal gait [Deep Tendon Reflexes (DTR)] : deep tendon reflexes were 2+ and symmetric [Normal Affect] : the affect was normal [Normal Insight/Judgement] : insight and judgment were intact

## 2024-06-03 NOTE — HISTORY OF PRESENT ILLNESS
[FreeTextEntry1] : New pt [de-identified] : 78 year old M comes to establish medical care and for an annual exam. Previously followed by

## 2024-06-04 ENCOUNTER — APPOINTMENT (OUTPATIENT)
Dept: INTERNAL MEDICINE | Facility: CLINIC | Age: 79
End: 2024-06-04

## 2024-06-04 DIAGNOSIS — I10 ESSENTIAL (PRIMARY) HYPERTENSION: ICD-10-CM

## 2024-06-04 DIAGNOSIS — E78.5 HYPERLIPIDEMIA, UNSPECIFIED: ICD-10-CM

## 2024-07-16 ENCOUNTER — APPOINTMENT (OUTPATIENT)
Dept: INTERNAL MEDICINE | Facility: CLINIC | Age: 79
End: 2024-07-16
Payer: MEDICARE

## 2024-07-16 VITALS
OXYGEN SATURATION: 96 % | BODY MASS INDEX: 27.77 KG/M2 | SYSTOLIC BLOOD PRESSURE: 134 MMHG | HEIGHT: 72 IN | RESPIRATION RATE: 14 BRPM | TEMPERATURE: 97.7 F | HEART RATE: 62 BPM | WEIGHT: 205 LBS | DIASTOLIC BLOOD PRESSURE: 68 MMHG

## 2024-07-16 VITALS — DIASTOLIC BLOOD PRESSURE: 60 MMHG | SYSTOLIC BLOOD PRESSURE: 130 MMHG

## 2024-07-16 DIAGNOSIS — I10 ESSENTIAL (PRIMARY) HYPERTENSION: ICD-10-CM

## 2024-07-16 DIAGNOSIS — I25.10 ATHEROSCLEROTIC HEART DISEASE OF NATIVE CORONARY ARTERY W/OUT ANGINA PECTORIS: ICD-10-CM

## 2024-07-16 DIAGNOSIS — E34.9 ENDOCRINE DISORDER, UNSPECIFIED: ICD-10-CM

## 2024-07-16 DIAGNOSIS — E78.5 HYPERLIPIDEMIA, UNSPECIFIED: ICD-10-CM

## 2024-07-16 DIAGNOSIS — Z86.79 PERSONAL HISTORY OF OTHER DISEASES OF THE CIRCULATORY SYSTEM: ICD-10-CM

## 2024-07-16 DIAGNOSIS — R73.03 PREDIABETES.: ICD-10-CM

## 2024-07-16 DIAGNOSIS — Z00.00 ENCOUNTER FOR GENERAL ADULT MEDICAL EXAMINATION W/OUT ABNORMAL FINDINGS: ICD-10-CM

## 2024-07-16 PROCEDURE — G2211 COMPLEX E/M VISIT ADD ON: CPT

## 2024-07-16 PROCEDURE — 99204 OFFICE O/P NEW MOD 45 MIN: CPT

## 2024-07-16 PROCEDURE — 36415 COLL VENOUS BLD VENIPUNCTURE: CPT

## 2024-07-16 RX ORDER — AMLODIPINE AND OLMESARTAN MEDOXOMIL 5; 40 MG/1; MG/1
5-40 TABLET ORAL TWICE DAILY
Qty: 180 | Refills: 3 | Status: ACTIVE | COMMUNITY

## 2024-07-20 ENCOUNTER — TRANSCRIPTION ENCOUNTER (OUTPATIENT)
Age: 79
End: 2024-07-20

## 2024-07-20 LAB
ALBUMIN SERPL ELPH-MCNC: 4.5 G/DL
ALP BLD-CCNC: 49 U/L
ALT SERPL-CCNC: 32 U/L
ANION GAP SERPL CALC-SCNC: 14 MMOL/L
AST SERPL-CCNC: 26 U/L
BASOPHILS # BLD AUTO: 0.1 K/UL
BASOPHILS NFR BLD AUTO: 1.2 %
BILIRUB SERPL-MCNC: 0.4 MG/DL
BUN SERPL-MCNC: 20 MG/DL
CALCIUM SERPL-MCNC: 9.8 MG/DL
CHLORIDE SERPL-SCNC: 104 MMOL/L
CHOLEST SERPL-MCNC: 171 MG/DL
CO2 SERPL-SCNC: 25 MMOL/L
CREAT SERPL-MCNC: 0.83 MG/DL
EGFR: 91 ML/MIN/1.73M2
EOSINOPHIL # BLD AUTO: 0.51 K/UL
EOSINOPHIL NFR BLD AUTO: 6 %
ESTIMATED AVERAGE GLUCOSE: 108 MG/DL
GLUCOSE SERPL-MCNC: 102 MG/DL
HBA1C MFR BLD HPLC: 5.4 %
HCT VFR BLD CALC: 47.4 %
HDLC SERPL-MCNC: 48 MG/DL
HGB BLD-MCNC: 14.9 G/DL
IMM GRANULOCYTES NFR BLD AUTO: 0.1 %
LDLC SERPL CALC-MCNC: 100 MG/DL
LYMPHOCYTES # BLD AUTO: 1.37 K/UL
LYMPHOCYTES NFR BLD AUTO: 16.1 %
MAN DIFF?: NORMAL
MCHC RBC-ENTMCNC: 28.3 PG
MCHC RBC-ENTMCNC: 31.4 GM/DL
MCV RBC AUTO: 90.1 FL
MONOCYTES # BLD AUTO: 0.76 K/UL
MONOCYTES NFR BLD AUTO: 8.9 %
NEUTROPHILS # BLD AUTO: 5.77 K/UL
NEUTROPHILS NFR BLD AUTO: 67.7 %
NONHDLC SERPL-MCNC: 122 MG/DL
PLATELET # BLD AUTO: 211 K/UL
POTASSIUM SERPL-SCNC: 3.8 MMOL/L
PROT SERPL-MCNC: 6.6 G/DL
RBC # BLD: 5.26 M/UL
RBC # FLD: 15 %
SODIUM SERPL-SCNC: 142 MMOL/L
TRIGL SERPL-MCNC: 128 MG/DL
WBC # FLD AUTO: 8.52 K/UL

## 2024-08-05 ENCOUNTER — APPOINTMENT (OUTPATIENT)
Dept: NEUROLOGY | Facility: CLINIC | Age: 79
End: 2024-08-05

## 2024-08-13 ENCOUNTER — NON-APPOINTMENT (OUTPATIENT)
Age: 79
End: 2024-08-13

## 2024-09-04 RX ORDER — SPIRONOLACTONE 25 MG/1
25 TABLET ORAL
Qty: 90 | Refills: 1 | Status: ACTIVE | COMMUNITY

## 2024-10-16 ENCOUNTER — APPOINTMENT (OUTPATIENT)
Dept: INTERNAL MEDICINE | Facility: CLINIC | Age: 79
End: 2024-10-16
Payer: MEDICARE

## 2024-10-16 VITALS
DIASTOLIC BLOOD PRESSURE: 60 MMHG | BODY MASS INDEX: 28.99 KG/M2 | RESPIRATION RATE: 14 BRPM | HEART RATE: 63 BPM | WEIGHT: 214 LBS | SYSTOLIC BLOOD PRESSURE: 130 MMHG | HEIGHT: 72 IN | OXYGEN SATURATION: 97 %

## 2024-10-16 DIAGNOSIS — I10 ESSENTIAL (PRIMARY) HYPERTENSION: ICD-10-CM

## 2024-10-16 DIAGNOSIS — E78.5 HYPERLIPIDEMIA, UNSPECIFIED: ICD-10-CM

## 2024-10-16 DIAGNOSIS — R10.31 RIGHT LOWER QUADRANT PAIN: ICD-10-CM

## 2024-10-16 DIAGNOSIS — I25.10 ATHEROSCLEROTIC HEART DISEASE OF NATIVE CORONARY ARTERY W/OUT ANGINA PECTORIS: ICD-10-CM

## 2024-10-16 DIAGNOSIS — R73.03 PREDIABETES.: ICD-10-CM

## 2024-10-16 PROCEDURE — G2211 COMPLEX E/M VISIT ADD ON: CPT

## 2024-10-16 PROCEDURE — 36415 COLL VENOUS BLD VENIPUNCTURE: CPT

## 2024-10-16 PROCEDURE — 99214 OFFICE O/P EST MOD 30 MIN: CPT

## 2024-10-17 ENCOUNTER — TRANSCRIPTION ENCOUNTER (OUTPATIENT)
Age: 79
End: 2024-10-17

## 2024-10-17 LAB
ALBUMIN SERPL ELPH-MCNC: 4.6 G/DL
ALP BLD-CCNC: 53 U/L
ALT SERPL-CCNC: 33 U/L
ANION GAP SERPL CALC-SCNC: 12 MMOL/L
AST SERPL-CCNC: 23 U/L
BILIRUB SERPL-MCNC: 0.3 MG/DL
BUN SERPL-MCNC: 28 MG/DL
CALCIUM SERPL-MCNC: 9.4 MG/DL
CHLORIDE SERPL-SCNC: 104 MMOL/L
CHOLEST SERPL-MCNC: 190 MG/DL
CO2 SERPL-SCNC: 25 MMOL/L
CREAT SERPL-MCNC: 1.08 MG/DL
EGFR: 71 ML/MIN/1.73M2
ESTIMATED AVERAGE GLUCOSE: 126 MG/DL
GLUCOSE SERPL-MCNC: 87 MG/DL
HBA1C MFR BLD HPLC: 6 %
HCT VFR BLD CALC: 48.5 %
HDLC SERPL-MCNC: 44 MG/DL
HGB BLD-MCNC: 15.7 G/DL
LDLC SERPL CALC-MCNC: 117 MG/DL
MCHC RBC-ENTMCNC: 28.3 PG
MCHC RBC-ENTMCNC: 32.4 GM/DL
MCV RBC AUTO: 87.4 FL
NONHDLC SERPL-MCNC: 146 MG/DL
PLATELET # BLD AUTO: 262 K/UL
POTASSIUM SERPL-SCNC: 4.6 MMOL/L
PROT SERPL-MCNC: 6.8 G/DL
PSA SERPL-MCNC: 2.67 NG/ML
RBC # BLD: 5.55 M/UL
RBC # FLD: 15.2 %
SODIUM SERPL-SCNC: 141 MMOL/L
TESTOST SERPL-MCNC: 285 NG/DL
TRIGL SERPL-MCNC: 164 MG/DL
WBC # FLD AUTO: 7.6 K/UL

## 2024-10-30 ENCOUNTER — APPOINTMENT (OUTPATIENT)
Dept: UROLOGY | Facility: CLINIC | Age: 79
End: 2024-10-30
Payer: MEDICARE

## 2024-10-30 DIAGNOSIS — R79.89 OTHER SPECIFIED ABNORMAL FINDINGS OF BLOOD CHEMISTRY: ICD-10-CM

## 2024-10-30 DIAGNOSIS — E34.9 ENDOCRINE DISORDER, UNSPECIFIED: ICD-10-CM

## 2024-10-30 DIAGNOSIS — R10.31 RIGHT LOWER QUADRANT PAIN: ICD-10-CM

## 2024-10-30 DIAGNOSIS — N40.1 BENIGN PROSTATIC HYPERPLASIA WITH LOWER URINARY TRACT SYMPMS: ICD-10-CM

## 2024-10-30 DIAGNOSIS — R71.8 OTHER ABNORMALITY OF RED BLOOD CELLS: ICD-10-CM

## 2024-10-30 PROCEDURE — G2211 COMPLEX E/M VISIT ADD ON: CPT

## 2024-10-30 PROCEDURE — 99214 OFFICE O/P EST MOD 30 MIN: CPT

## 2024-10-31 LAB
APPEARANCE: CLEAR
BACTERIA: NEGATIVE /HPF
BILIRUBIN URINE: NEGATIVE
BLOOD URINE: NEGATIVE
CAST: 0 /LPF
COLOR: YELLOW
EPITHELIAL CELLS: 0 /HPF
GLUCOSE QUALITATIVE U: NEGATIVE MG/DL
KETONES URINE: NEGATIVE MG/DL
LEUKOCYTE ESTERASE URINE: ABNORMAL
MICROSCOPIC-UA: NORMAL
NITRITE URINE: NEGATIVE
PH URINE: 6
PROTEIN URINE: NEGATIVE MG/DL
RED BLOOD CELLS URINE: 0 /HPF
SPECIFIC GRAVITY URINE: 1.01
UROBILINOGEN URINE: 0.2 MG/DL
WHITE BLOOD CELLS URINE: 2 /HPF

## 2024-11-14 NOTE — OCCUPATIONAL THERAPY INITIAL EVALUATION ADULT - RANGE OF MOTION EXAMINATION, UPPER EXTREMITY
Hospitalized or in ED since last visit: No    Medication Changes: No    Upcoming/Recent Procedures: No    Referral Expiration Date Approaching: No    BP Readings from Last 1 Encounters:   11/04/24 (!) 144/78       
bilateral UE Active ROM was WFL  (within functional limits)
bilateral UE Active ROM was WNL (within normal limits)

## 2024-11-20 ENCOUNTER — APPOINTMENT (OUTPATIENT)
Dept: UROLOGY | Facility: CLINIC | Age: 79
End: 2024-11-20
Payer: MEDICARE

## 2024-11-20 VITALS
HEIGHT: 72 IN | WEIGHT: 214 LBS | HEART RATE: 56 BPM | OXYGEN SATURATION: 96 % | SYSTOLIC BLOOD PRESSURE: 168 MMHG | BODY MASS INDEX: 28.99 KG/M2 | DIASTOLIC BLOOD PRESSURE: 66 MMHG | RESPIRATION RATE: 16 BRPM | TEMPERATURE: 98 F

## 2024-11-20 DIAGNOSIS — N40.1 BENIGN PROSTATIC HYPERPLASIA WITH LOWER URINARY TRACT SYMPMS: ICD-10-CM

## 2024-11-20 PROCEDURE — 76872 US TRANSRECTAL: CPT

## 2024-11-20 PROCEDURE — 99214 OFFICE O/P EST MOD 30 MIN: CPT | Mod: 25

## 2024-11-20 PROCEDURE — 52000 CYSTOURETHROSCOPY: CPT

## 2024-11-20 RX ORDER — TAMSULOSIN HYDROCHLORIDE 0.4 MG/1
0.4 CAPSULE ORAL
Qty: 90 | Refills: 3 | Status: ACTIVE | COMMUNITY
Start: 2024-11-20 | End: 1900-01-01

## 2024-11-26 ENCOUNTER — NON-APPOINTMENT (OUTPATIENT)
Age: 79
End: 2024-11-26

## 2025-04-23 ENCOUNTER — APPOINTMENT (OUTPATIENT)
Dept: INTERNAL MEDICINE | Facility: CLINIC | Age: 80
End: 2025-04-23
Payer: MEDICARE

## 2025-04-23 VITALS
HEART RATE: 62 BPM | RESPIRATION RATE: 14 BRPM | DIASTOLIC BLOOD PRESSURE: 64 MMHG | TEMPERATURE: 98.1 F | OXYGEN SATURATION: 97 % | HEIGHT: 72 IN | SYSTOLIC BLOOD PRESSURE: 124 MMHG | BODY MASS INDEX: 26.41 KG/M2 | WEIGHT: 195 LBS

## 2025-04-23 VITALS — DIASTOLIC BLOOD PRESSURE: 60 MMHG | SYSTOLIC BLOOD PRESSURE: 126 MMHG

## 2025-04-23 DIAGNOSIS — R73.03 PREDIABETES.: ICD-10-CM

## 2025-04-23 DIAGNOSIS — Z23 ENCOUNTER FOR IMMUNIZATION: ICD-10-CM

## 2025-04-23 DIAGNOSIS — E78.5 HYPERLIPIDEMIA, UNSPECIFIED: ICD-10-CM

## 2025-04-23 DIAGNOSIS — I10 ESSENTIAL (PRIMARY) HYPERTENSION: ICD-10-CM

## 2025-04-23 DIAGNOSIS — E55.9 VITAMIN D DEFICIENCY, UNSPECIFIED: ICD-10-CM

## 2025-04-23 DIAGNOSIS — I25.10 ATHEROSCLEROTIC HEART DISEASE OF NATIVE CORONARY ARTERY W/OUT ANGINA PECTORIS: ICD-10-CM

## 2025-04-23 DIAGNOSIS — N40.1 BENIGN PROSTATIC HYPERPLASIA WITH LOWER URINARY TRACT SYMPMS: ICD-10-CM

## 2025-04-23 DIAGNOSIS — Z00.00 ENCOUNTER FOR GENERAL ADULT MEDICAL EXAMINATION W/OUT ABNORMAL FINDINGS: ICD-10-CM

## 2025-04-23 PROCEDURE — 36415 COLL VENOUS BLD VENIPUNCTURE: CPT

## 2025-04-23 PROCEDURE — G0439: CPT

## 2025-04-30 ENCOUNTER — TRANSCRIPTION ENCOUNTER (OUTPATIENT)
Age: 80
End: 2025-04-30

## 2025-04-30 ENCOUNTER — APPOINTMENT (OUTPATIENT)
Dept: UROLOGY | Facility: CLINIC | Age: 80
End: 2025-04-30

## 2025-04-30 LAB
25(OH)D3 SERPL-MCNC: 25.7 NG/ML
ALBUMIN SERPL ELPH-MCNC: 4.6 G/DL
ALP BLD-CCNC: 52 U/L
ALT SERPL-CCNC: 24 U/L
ANION GAP SERPL CALC-SCNC: 16 MMOL/L
AST SERPL-CCNC: 22 U/L
BASOPHILS # BLD AUTO: 0.11 K/UL
BASOPHILS NFR BLD AUTO: 1.6 %
BILIRUB SERPL-MCNC: 0.6 MG/DL
BUN SERPL-MCNC: 18 MG/DL
CALCIUM SERPL-MCNC: 9.8 MG/DL
CHLORIDE SERPL-SCNC: 106 MMOL/L
CHOLEST SERPL-MCNC: 207 MG/DL
CO2 SERPL-SCNC: 22 MMOL/L
CREAT SERPL-MCNC: 0.87 MG/DL
EGFRCR SERPLBLD CKD-EPI 2021: 89 ML/MIN/1.73M2
EOSINOPHIL # BLD AUTO: 0.48 K/UL
EOSINOPHIL NFR BLD AUTO: 6.9 %
ESTIMATED AVERAGE GLUCOSE: 120 MG/DL
GLUCOSE SERPL-MCNC: 103 MG/DL
HBA1C MFR BLD HPLC: 5.8 %
HCT VFR BLD CALC: 48.9 %
HDLC SERPL-MCNC: 51 MG/DL
HGB BLD-MCNC: 15.5 G/DL
IMM GRANULOCYTES NFR BLD AUTO: 0.1 %
LDLC SERPL-MCNC: 137 MG/DL
LYMPHOCYTES # BLD AUTO: 1.55 K/UL
LYMPHOCYTES NFR BLD AUTO: 22.3 %
MAN DIFF?: NORMAL
MCHC RBC-ENTMCNC: 27.9 PG
MCHC RBC-ENTMCNC: 31.7 G/DL
MCV RBC AUTO: 88.1 FL
MONOCYTES # BLD AUTO: 0.58 K/UL
MONOCYTES NFR BLD AUTO: 8.3 %
NEUTROPHILS # BLD AUTO: 4.23 K/UL
NEUTROPHILS NFR BLD AUTO: 60.8 %
NONHDLC SERPL-MCNC: 156 MG/DL
PLATELET # BLD AUTO: 263 K/UL
POTASSIUM SERPL-SCNC: 4.2 MMOL/L
PROT SERPL-MCNC: 6.9 G/DL
PSA SERPL-MCNC: 2 NG/ML
RBC # BLD: 5.55 M/UL
RBC # FLD: 15.3 %
SODIUM SERPL-SCNC: 144 MMOL/L
TRIGL SERPL-MCNC: 107 MG/DL
TSH SERPL-ACNC: 1.48 UIU/ML
VIT B12 SERPL-MCNC: 465 PG/ML
WBC # FLD AUTO: 6.96 K/UL

## 2025-05-05 ENCOUNTER — NON-APPOINTMENT (OUTPATIENT)
Age: 80
End: 2025-05-05

## 2025-05-07 ENCOUNTER — APPOINTMENT (OUTPATIENT)
Dept: NEUROLOGY | Facility: CLINIC | Age: 80
End: 2025-05-07
Payer: MEDICARE

## 2025-05-07 VITALS
SYSTOLIC BLOOD PRESSURE: 134 MMHG | WEIGHT: 195 LBS | HEIGHT: 72 IN | HEART RATE: 58 BPM | BODY MASS INDEX: 26.41 KG/M2 | DIASTOLIC BLOOD PRESSURE: 71 MMHG

## 2025-05-07 DIAGNOSIS — I63.412 CEREBRAL INFARCTION DUE TO EMBOLISM OF LEFT MIDDLE CEREBRAL ARTERY: ICD-10-CM

## 2025-05-07 PROCEDURE — 99203 OFFICE O/P NEW LOW 30 MIN: CPT

## 2025-05-20 ENCOUNTER — APPOINTMENT (OUTPATIENT)
Dept: UROLOGY | Facility: CLINIC | Age: 80
End: 2025-05-20
Payer: MEDICARE

## 2025-05-20 VITALS
SYSTOLIC BLOOD PRESSURE: 166 MMHG | DIASTOLIC BLOOD PRESSURE: 70 MMHG | OXYGEN SATURATION: 95 % | HEART RATE: 59 BPM | RESPIRATION RATE: 16 BRPM

## 2025-05-20 DIAGNOSIS — R79.89 OTHER SPECIFIED ABNORMAL FINDINGS OF BLOOD CHEMISTRY: ICD-10-CM

## 2025-05-20 DIAGNOSIS — E34.9 ENDOCRINE DISORDER, UNSPECIFIED: ICD-10-CM

## 2025-05-20 DIAGNOSIS — N40.1 BENIGN PROSTATIC HYPERPLASIA WITH LOWER URINARY TRACT SYMPMS: ICD-10-CM

## 2025-05-20 PROCEDURE — 99214 OFFICE O/P EST MOD 30 MIN: CPT

## 2025-05-21 LAB — TESTOST SERPL-MCNC: 337 NG/DL

## (undated) DEVICE — DRAPE TOWEL BLUE 17" X 24"

## (undated) DEVICE — SUT POLYSORB 3-0 30" V-20 UNDYED

## (undated) DEVICE — SYR ASEPTO

## (undated) DEVICE — BLADE SCALPEL SAFETY #15

## (undated) DEVICE — DRAPE INSTRUMENT POUCH

## (undated) DEVICE — SUT POLYSORB 2-0 30" GS-22 UNDYED

## (undated) DEVICE — SOL IRR POUR NS 0.9% 1000ML

## (undated) DEVICE — GLV 8 ESTEEM BLUE

## (undated) DEVICE — ELCTR EDGE BOVIE INSULATED BLADE TIP 2.75"

## (undated) DEVICE — NDL HYPO REGULAR BEVEL 25G X 1.5"

## (undated) DEVICE — DRSG TAPE MEDIPORE 4"

## (undated) DEVICE — SUT POLYSORB 2-0 30" V-20 UNDYED

## (undated) DEVICE — DRAPE 3/4 SHEET W REINFORCEMENT 56X77"

## (undated) DEVICE — SUT POLYSORB 2-0 60" REEL

## (undated) DEVICE — PLV-SCD MACHINE: Type: DURABLE MEDICAL EQUIPMENT

## (undated) DEVICE — WRAP COMPRESSION CALF MED

## (undated) DEVICE — PACK MINOR WITH LAP

## (undated) DEVICE — GLV 7.5 PROTEXIS

## (undated) DEVICE — SUT POLYSORB 4-0 18" P-12 UNDYED

## (undated) DEVICE — BLANKET WARMER UPPER ADULT

## (undated) DEVICE — SUCTION YANKAUER NO CONTROL VENT

## (undated) DEVICE — GOWN XL EXTRA LONG